# Patient Record
Sex: FEMALE | Race: WHITE | NOT HISPANIC OR LATINO | Employment: OTHER | URBAN - METROPOLITAN AREA
[De-identification: names, ages, dates, MRNs, and addresses within clinical notes are randomized per-mention and may not be internally consistent; named-entity substitution may affect disease eponyms.]

---

## 2017-02-09 RX ORDER — FLUOXETINE HYDROCHLORIDE 20 MG/1
CAPSULE ORAL
Qty: 90 CAPSULE | Refills: 0 | Status: SHIPPED | OUTPATIENT
Start: 2017-02-09 | End: 2017-05-08 | Stop reason: SDUPTHER

## 2017-05-09 RX ORDER — FLUOXETINE HYDROCHLORIDE 20 MG/1
CAPSULE ORAL
Qty: 90 CAPSULE | Refills: 1 | Status: SHIPPED | OUTPATIENT
Start: 2017-05-09 | End: 2017-07-24 | Stop reason: DRUGHIGH

## 2017-05-10 ENCOUNTER — TELEPHONE (OUTPATIENT)
Dept: ORTHOPEDICS | Age: 67
End: 2017-05-10

## 2017-05-10 DIAGNOSIS — Z96.642 STATUS POST LEFT HIP REPLACEMENT: Primary | ICD-10-CM

## 2017-05-10 RX ORDER — ZOLPIDEM TARTRATE 10 MG/1
10 TABLET ORAL NIGHTLY PRN
Qty: 30 TABLET | Refills: 5 | Status: SHIPPED
Start: 2017-05-10 | End: 2017-07-24 | Stop reason: SDUPTHER

## 2017-05-24 ENCOUNTER — TELEPHONE (OUTPATIENT)
Dept: FAMILY MEDICINE | Age: 67
End: 2017-05-24

## 2017-05-25 RX ORDER — LOSARTAN POTASSIUM 25 MG/1
50 TABLET ORAL DAILY
Qty: 90 TABLET | Refills: 1 | Status: CANCELLED | OUTPATIENT
Start: 2017-05-25

## 2017-05-26 RX ORDER — LOSARTAN POTASSIUM 50 MG/1
50 TABLET ORAL DAILY
Qty: 90 TABLET | Refills: 1 | Status: SHIPPED | OUTPATIENT
Start: 2017-05-26 | End: 2017-07-24 | Stop reason: DRUGHIGH

## 2017-06-19 ENCOUNTER — TELEPHONE (OUTPATIENT)
Dept: FAMILY MEDICINE | Age: 67
End: 2017-06-19

## 2017-06-26 RX ORDER — FLUTICASONE PROPIONATE AND SALMETEROL 250; 50 UG/1; UG/1
1 POWDER RESPIRATORY (INHALATION) 2 TIMES DAILY
Qty: 1 EACH | Refills: 0 | Status: SHIPPED | OUTPATIENT
Start: 2017-06-26 | End: 2017-07-25 | Stop reason: SDUPTHER

## 2017-07-18 ENCOUNTER — OFF PREMISE (OUTPATIENT)
Dept: OTHER | Age: 67
End: 2017-07-18

## 2017-07-22 RX ORDER — CLONAZEPAM 1 MG/1
TABLET ORAL
Qty: 60 TABLET | Refills: 5 | Status: SHIPPED
Start: 2017-07-22 | End: 2017-07-24 | Stop reason: SDUPTHER

## 2017-07-24 ENCOUNTER — IMAGING SERVICES (OUTPATIENT)
Dept: GENERAL RADIOLOGY | Age: 67
End: 2017-07-24
Attending: FAMILY MEDICINE

## 2017-07-24 ENCOUNTER — LAB SERVICES (OUTPATIENT)
Dept: LAB | Age: 67
End: 2017-07-24

## 2017-07-24 ENCOUNTER — OFFICE VISIT (OUTPATIENT)
Dept: FAMILY MEDICINE | Age: 67
End: 2017-07-24

## 2017-07-24 ENCOUNTER — TELEPHONE (OUTPATIENT)
Dept: ORTHOPEDICS | Age: 67
End: 2017-07-24

## 2017-07-24 VITALS
SYSTOLIC BLOOD PRESSURE: 138 MMHG | OXYGEN SATURATION: 98 % | HEART RATE: 68 BPM | DIASTOLIC BLOOD PRESSURE: 88 MMHG | WEIGHT: 194.4 LBS | HEIGHT: 65 IN | BODY MASS INDEX: 32.39 KG/M2

## 2017-07-24 DIAGNOSIS — Z12.31 VISIT FOR SCREENING MAMMOGRAM: ICD-10-CM

## 2017-07-24 DIAGNOSIS — Z13.0 SCREENING FOR IRON DEFICIENCY ANEMIA: ICD-10-CM

## 2017-07-24 DIAGNOSIS — Z96.642 STATUS POST LEFT HIP REPLACEMENT: ICD-10-CM

## 2017-07-24 DIAGNOSIS — I10 BENIGN ESSENTIAL HYPERTENSION: ICD-10-CM

## 2017-07-24 DIAGNOSIS — Z13.1 SCREENING FOR DIABETES MELLITUS (DM): ICD-10-CM

## 2017-07-24 DIAGNOSIS — Z13.29 SCREENING FOR THYROID DISORDER: ICD-10-CM

## 2017-07-24 DIAGNOSIS — Z00.00 WELL WOMAN EXAM (NO GYNECOLOGICAL EXAM): Primary | ICD-10-CM

## 2017-07-24 DIAGNOSIS — Z13.220 SCREENING FOR LIPOID DISORDERS: ICD-10-CM

## 2017-07-24 LAB
ALBUMIN SERPL-MCNC: 3.7 G/DL (ref 3.6–5.1)
ALBUMIN/GLOB SERPL: 1 {RATIO} (ref 1–2.4)
ALP SERPL-CCNC: 130 UNITS/L (ref 45–117)
ALT SERPL-CCNC: 21 UNITS/L
ANION GAP SERPL CALC-SCNC: 9 MMOL/L (ref 10–20)
AST SERPL-CCNC: 16 UNITS/L
BASOPHILS # BLD AUTO: 0 K/MCL (ref 0–0.3)
BASOPHILS NFR BLD AUTO: 0 %
BILIRUB SERPL-MCNC: 0.4 MG/DL (ref 0.2–1)
BUN SERPL-MCNC: 19 MG/DL (ref 6–20)
BUN/CREAT SERPL: 23 (ref 7–25)
CALCIUM SERPL-MCNC: 9 MG/DL (ref 8.4–10.2)
CHLORIDE SERPL-SCNC: 103 MMOL/L (ref 98–107)
CO2 SERPL-SCNC: 33 MMOL/L (ref 21–32)
CREAT SERPL-MCNC: 0.83 MG/DL (ref 0.51–0.95)
DIFFERENTIAL METHOD BLD: NORMAL
EOSINOPHIL # BLD AUTO: 0.3 K/MCL (ref 0.1–0.5)
EOSINOPHIL NFR SPEC: 4 %
ERYTHROCYTE [DISTWIDTH] IN BLOOD: 12.6 % (ref 11–15)
FASTING STATUS PATIENT QL REPORTED: 4 HRS
GLOBULIN SER-MCNC: 3.7 G/DL (ref 2–4)
GLUCOSE SERPL-MCNC: 87 MG/DL (ref 65–99)
HCT VFR BLD CALC: 37.8 % (ref 36–46.5)
HGB BLD-MCNC: 13.1 G/DL (ref 12–15.5)
LYMPHOCYTES # BLD MANUAL: 1.2 K/MCL (ref 1–4)
LYMPHOCYTES NFR BLD MANUAL: 19 %
MCH RBC QN AUTO: 32 PG (ref 26–34)
MCHC RBC AUTO-ENTMCNC: 34.7 G/DL (ref 32–36.5)
MCV RBC AUTO: 92.2 FL (ref 78–100)
MONOCYTES # BLD MANUAL: 0.5 K/MCL (ref 0.3–0.9)
MONOCYTES NFR BLD MANUAL: 8 %
NEUTROPHILS # BLD AUTO: 4.4 K/MCL (ref 1.8–7.7)
NEUTROPHILS NFR BLD AUTO: 69 %
PLATELET # BLD: 302 K/MCL (ref 140–450)
POTASSIUM SERPL-SCNC: 4.9 MMOL/L (ref 3.4–5.1)
PROT SERPL-MCNC: 7.4 G/DL (ref 6.4–8.2)
RBC # BLD: 4.1 MIL/MCL (ref 4–5.2)
SODIUM SERPL-SCNC: 140 MMOL/L (ref 135–145)
WBC # BLD: 6.4 K/MCL (ref 4.2–11)

## 2017-07-24 PROCEDURE — 80061 LIPID PANEL: CPT | Performed by: INTERNAL MEDICINE

## 2017-07-24 PROCEDURE — 80050 GENERAL HEALTH PANEL: CPT | Performed by: INTERNAL MEDICINE

## 2017-07-24 PROCEDURE — 83018 HEAVY METAL QUAN EACH NES: CPT | Performed by: INTERNAL MEDICINE

## 2017-07-24 PROCEDURE — 82495 ASSAY OF CHROMIUM: CPT | Performed by: INTERNAL MEDICINE

## 2017-07-24 PROCEDURE — 99396 PREV VISIT EST AGE 40-64: CPT | Performed by: FAMILY MEDICINE

## 2017-07-24 PROCEDURE — G0202 SCR MAMMO BI INCL CAD: HCPCS | Performed by: RADIOLOGY

## 2017-07-24 PROCEDURE — 36415 COLL VENOUS BLD VENIPUNCTURE: CPT | Performed by: INTERNAL MEDICINE

## 2017-07-24 RX ORDER — FLUOXETINE 10 MG/1
10 CAPSULE ORAL DAILY
Qty: 30 CAPSULE | Refills: 0 | Status: SHIPPED | OUTPATIENT
Start: 2017-07-24 | End: 2017-08-14

## 2017-07-24 RX ORDER — LOSARTAN POTASSIUM 100 MG/1
100 TABLET ORAL DAILY
Qty: 30 TABLET | Refills: 11 | Status: SHIPPED | OUTPATIENT
Start: 2017-07-24 | End: 2018-09-12 | Stop reason: SDUPTHER

## 2017-07-24 RX ORDER — CLONAZEPAM 1 MG/1
TABLET ORAL
Qty: 60 TABLET | Refills: 5 | Status: SHIPPED | OUTPATIENT
Start: 2017-07-24 | End: 2018-01-29 | Stop reason: SDUPTHER

## 2017-07-24 RX ORDER — ZOLPIDEM TARTRATE 10 MG/1
10 TABLET ORAL NIGHTLY PRN
Qty: 30 TABLET | Refills: 5 | Status: SHIPPED | OUTPATIENT
Start: 2017-07-24 | End: 2018-01-29 | Stop reason: SDUPTHER

## 2017-07-25 ENCOUNTER — TELEPHONE (OUTPATIENT)
Dept: ORTHOPEDICS | Age: 67
End: 2017-07-25

## 2017-07-25 ENCOUNTER — IMAGING SERVICES (OUTPATIENT)
Dept: GENERAL RADIOLOGY | Age: 67
End: 2017-07-25
Attending: ORTHOPAEDIC SURGERY

## 2017-07-25 ENCOUNTER — OFFICE VISIT (OUTPATIENT)
Dept: ORTHOPEDICS | Age: 67
End: 2017-07-25

## 2017-07-25 VITALS — SYSTOLIC BLOOD PRESSURE: 145 MMHG | DIASTOLIC BLOOD PRESSURE: 93 MMHG | HEART RATE: 76 BPM

## 2017-07-25 DIAGNOSIS — Z96.642 STATUS POST LEFT HIP REPLACEMENT: ICD-10-CM

## 2017-07-25 DIAGNOSIS — Z96.642 STATUS POST LEFT HIP REPLACEMENT: Primary | ICD-10-CM

## 2017-07-25 LAB
CHOLEST SERPL-MCNC: 209 MG/DL
CHOLEST/HDLC SERPL: 4.1 {RATIO}
HDLC SERPL-MCNC: 51 MG/DL
LDLC SERPL-MCNC: 118 MG/DL
LENGTH OF FAST TIME PATIENT: 4 HRS
NONHDLC SERPL-MCNC: 158 MG/DL
TRIGL SERPL-MCNC: 199 MG/DL
TSH SERPL-ACNC: 0.99 MCUNITS/ML (ref 0.35–5)

## 2017-07-25 PROCEDURE — 99213 OFFICE O/P EST LOW 20 MIN: CPT | Performed by: ORTHOPAEDIC SURGERY

## 2017-07-25 PROCEDURE — 73502 X-RAY EXAM HIP UNI 2-3 VIEWS: CPT | Performed by: RADIOLOGY

## 2017-07-25 RX ORDER — ALBUTEROL SULFATE 90 UG/1
2 AEROSOL, METERED RESPIRATORY (INHALATION) EVERY 4 HOURS PRN
Qty: 1 INHALER | Refills: 0 | Status: SHIPPED | OUTPATIENT
Start: 2017-07-25

## 2017-07-25 RX ORDER — FLUTICASONE PROPIONATE AND SALMETEROL 250; 50 UG/1; UG/1
1 POWDER RESPIRATORY (INHALATION) 2 TIMES DAILY
Qty: 1 EACH | Refills: 0 | Status: SHIPPED | OUTPATIENT
Start: 2017-07-25

## 2017-07-26 ENCOUNTER — TELEPHONE (OUTPATIENT)
Dept: ORTHOPEDICS | Age: 67
End: 2017-07-26

## 2017-07-26 LAB — COBALT SERPL-MCNC: 3.6 UG/L

## 2017-07-28 ENCOUNTER — PREP FOR CASE (OUTPATIENT)
Dept: ORTHOPEDICS | Age: 67
End: 2017-07-28

## 2017-07-28 ENCOUNTER — TELEPHONE (OUTPATIENT)
Dept: ORTHOPEDICS | Age: 67
End: 2017-07-28

## 2017-07-28 DIAGNOSIS — T84.019A PROSTHETIC JOINT IMPLANT FAILURE, INITIAL ENCOUNTER (CMD): ICD-10-CM

## 2017-07-28 DIAGNOSIS — S76.012A MUSCLE STRAIN OF GLUTEAL REGION, LEFT, INITIAL ENCOUNTER: Primary | ICD-10-CM

## 2017-07-28 LAB — CR SERPL-MCNC: <1 NG/ML

## 2017-07-31 ENCOUNTER — TELEPHONE (OUTPATIENT)
Dept: ORTHOPEDICS | Age: 67
End: 2017-07-31

## 2017-07-31 DIAGNOSIS — S76.012A MUSCLE STRAIN OF GLUTEAL REGION, LEFT, INITIAL ENCOUNTER: ICD-10-CM

## 2017-07-31 DIAGNOSIS — T84.019A PROSTHETIC JOINT IMPLANT FAILURE, INITIAL ENCOUNTER (CMD): ICD-10-CM

## 2017-07-31 DIAGNOSIS — Z01.812 ENCOUNTER FOR PREPROCEDURAL LABORATORY EXAMINATION: ICD-10-CM

## 2017-07-31 DIAGNOSIS — Z01.811 ENCOUNTER FOR PRE-OPERATIVE RESPIRATORY CLEARANCE: ICD-10-CM

## 2017-07-31 DIAGNOSIS — Z01.810 ENCOUNTER FOR PRE-OPERATIVE CARDIOVASCULAR CLEARANCE: ICD-10-CM

## 2017-07-31 DIAGNOSIS — Z01.818 PREOP GENERAL PHYSICAL EXAM: ICD-10-CM

## 2017-08-01 ENCOUNTER — TELEPHONE (OUTPATIENT)
Dept: ORTHOPEDICS | Age: 67
End: 2017-08-01

## 2017-08-03 DIAGNOSIS — Z96.642 STATUS POST LEFT HIP REPLACEMENT: Primary | ICD-10-CM

## 2017-08-03 DIAGNOSIS — Z01.818 PREOPERATIVE EXAMINATION: ICD-10-CM

## 2017-08-03 DIAGNOSIS — M25.552 LEFT HIP PAIN: ICD-10-CM

## 2017-08-03 DIAGNOSIS — M76.892 ENTHESOPATHY OF LEFT HIP REGION: ICD-10-CM

## 2017-08-04 ENCOUNTER — TELEPHONE (OUTPATIENT)
Dept: ORTHOPEDICS | Age: 67
End: 2017-08-04

## 2017-08-08 ENCOUNTER — HOSPITAL ENCOUNTER (OUTPATIENT)
Dept: REHABILITATION | Age: 67
Discharge: STILL ADMITTED OR ADMINISTRATIVE | End: 2017-08-08
Attending: ORTHOPAEDIC SURGERY

## 2017-08-08 ENCOUNTER — OFFICE VISIT (OUTPATIENT)
Dept: ORTHOPEDICS | Age: 67
End: 2017-08-08

## 2017-08-08 ENCOUNTER — LAB SERVICES (OUTPATIENT)
Dept: LAB | Age: 67
End: 2017-08-08

## 2017-08-08 DIAGNOSIS — T84.019A PROSTHETIC JOINT IMPLANT FAILURE, INITIAL ENCOUNTER (CMD): ICD-10-CM

## 2017-08-08 DIAGNOSIS — T84.091A: ICD-10-CM

## 2017-08-08 DIAGNOSIS — Z01.818 PREOPERATIVE EXAMINATION: ICD-10-CM

## 2017-08-08 DIAGNOSIS — S76.012A MUSCLE STRAIN OF GLUTEAL REGION, LEFT, INITIAL ENCOUNTER: ICD-10-CM

## 2017-08-08 DIAGNOSIS — Z96.642 STATUS POST LEFT HIP REPLACEMENT: ICD-10-CM

## 2017-08-08 DIAGNOSIS — M76.892 ENTHESOPATHY OF LEFT HIP REGION: ICD-10-CM

## 2017-08-08 DIAGNOSIS — M25.552 LEFT HIP PAIN: ICD-10-CM

## 2017-08-08 DIAGNOSIS — M25.552 LEFT HIP PAIN: Primary | ICD-10-CM

## 2017-08-08 DIAGNOSIS — Z01.812 ENCOUNTER FOR PREPROCEDURAL LABORATORY EXAMINATION: ICD-10-CM

## 2017-08-08 LAB
AMORPH SED URNS QL MICRO: PRESENT
APPEARANCE UR: CLEAR
BACTERIA #/AREA URNS HPF: ABNORMAL /HPF
BILIRUB UR QL STRIP: NEGATIVE
COLOR UR: YELLOW
GLUCOSE UR STRIP-MCNC: NEGATIVE MG/DL
HGB UR QL STRIP: ABNORMAL
HYALINE CASTS #/AREA URNS LPF: ABNORMAL /LPF (ref 0–5)
KETONES UR STRIP-MCNC: NEGATIVE MG/DL
LEUKOCYTE ESTERASE UR QL STRIP: NEGATIVE
MUCOUS THREADS URNS QL MICRO: PRESENT
NITRITE UR QL STRIP: NEGATIVE
PH UR STRIP: 6 UNITS (ref 5–7)
PROT UR STRIP-MCNC: NEGATIVE MG/DL
RBC #/AREA URNS HPF: ABNORMAL /HPF (ref 0–3)
SP GR UR STRIP: 1.01 (ref 1–1.03)
SPECIMEN SOURCE: ABNORMAL
SQUAMOUS #/AREA URNS HPF: ABNORMAL /HPF (ref 0–5)
UROBILINOGEN UR STRIP-MCNC: 0.2 MG/DL (ref 0–1)
WBC #/AREA URNS HPF: ABNORMAL /HPF (ref 0–5)

## 2017-08-08 PROCEDURE — G8980 MOBILITY D/C STATUS: HCPCS

## 2017-08-08 PROCEDURE — 81001 URINALYSIS AUTO W/SCOPE: CPT | Performed by: INTERNAL MEDICINE

## 2017-08-08 PROCEDURE — 10004173 HB COUNTER-THERAPY VISIT PT

## 2017-08-08 PROCEDURE — G8428 CUR MEDS NOT DOCUMENT: HCPCS | Performed by: ORTHOPAEDIC SURGERY

## 2017-08-08 PROCEDURE — 3014F SCREEN MAMMO DOC REV: CPT | Performed by: ORTHOPAEDIC SURGERY

## 2017-08-08 PROCEDURE — G8419 CALC BMI OUT NRM PARAM NOF/U: HCPCS | Performed by: ORTHOPAEDIC SURGERY

## 2017-08-08 PROCEDURE — G8979 MOBILITY GOAL STATUS: HCPCS

## 2017-08-08 PROCEDURE — 3017F COLORECTAL CA SCREEN DOC REV: CPT | Performed by: ORTHOPAEDIC SURGERY

## 2017-08-08 PROCEDURE — G8732 NO DOC OF PAIN: HCPCS | Performed by: ORTHOPAEDIC SURGERY

## 2017-08-08 PROCEDURE — G8978 MOBILITY CURRENT STATUS: HCPCS

## 2017-08-08 PROCEDURE — 97530 THERAPEUTIC ACTIVITIES: CPT

## 2017-08-08 PROCEDURE — 97161 PT EVAL LOW COMPLEX 20 MIN: CPT

## 2017-08-08 PROCEDURE — 99214 OFFICE O/P EST MOD 30 MIN: CPT | Performed by: ORTHOPAEDIC SURGERY

## 2017-08-08 PROCEDURE — 1036F TOBACCO NON-USER: CPT | Performed by: ORTHOPAEDIC SURGERY

## 2017-08-08 PROCEDURE — 97116 GAIT TRAINING THERAPY: CPT

## 2017-08-09 ENCOUNTER — HOSPITAL ENCOUNTER (OUTPATIENT)
Dept: ORTHOPEDICS | Age: 67
End: 2017-08-09

## 2017-08-09 ENCOUNTER — LAB SERVICES (OUTPATIENT)
Dept: LAB | Age: 67
End: 2017-08-09

## 2017-08-09 ENCOUNTER — HOSPITAL ENCOUNTER (OUTPATIENT)
Dept: LAB | Age: 67
Discharge: HOME | End: 2017-08-09
Attending: ORTHOPAEDIC SURGERY

## 2017-08-09 ENCOUNTER — HOSPITAL ENCOUNTER (OUTPATIENT)
Dept: GENERAL RADIOLOGY | Age: 67
Discharge: HOME | End: 2017-08-09
Attending: ORTHOPAEDIC SURGERY

## 2017-08-09 ENCOUNTER — OFFICE VISIT (OUTPATIENT)
Dept: CARDIOLOGY | Age: 67
End: 2017-08-09
Attending: ORTHOPAEDIC SURGERY

## 2017-08-09 DIAGNOSIS — Z01.810 ENCOUNTER FOR PRE-OPERATIVE CARDIOVASCULAR CLEARANCE: Primary | ICD-10-CM

## 2017-08-09 DIAGNOSIS — T84.019A PROSTHETIC JOINT IMPLANT FAILURE, INITIAL ENCOUNTER (CMD): ICD-10-CM

## 2017-08-09 DIAGNOSIS — Z01.812 ENCOUNTER FOR PREPROCEDURAL LABORATORY EXAMINATION: ICD-10-CM

## 2017-08-09 DIAGNOSIS — S76.012A MUSCLE STRAIN OF GLUTEAL REGION, LEFT, INITIAL ENCOUNTER: ICD-10-CM

## 2017-08-09 DIAGNOSIS — Z01.811 ENCOUNTER FOR PRE-OPERATIVE RESPIRATORY CLEARANCE: ICD-10-CM

## 2017-08-09 LAB
ABO + RH BLD: NORMAL
ANION GAP SERPL CALC-SCNC: 13 MMOL/L (ref 10–20)
BACTERIA UR CULT: NORMAL
BASOPHILS # BLD AUTO: 0.1 K/MCL (ref 0–0.3)
BASOPHILS NFR BLD AUTO: 1 %
BLD GP AB SCN SERPL QL: NEGATIVE
BLOOD BANK CMNT PATIENT-IMP: NORMAL
BUN SERPL-MCNC: 12 MG/DL (ref 6–20)
BUN/CREAT SERPL: 14 (ref 7–25)
CALCIUM SERPL-MCNC: 8.4 MG/DL (ref 8.4–10.2)
CHLORIDE SERPL-SCNC: 105 MMOL/L (ref 98–107)
CO2 SERPL-SCNC: 29 MMOL/L (ref 21–32)
CREAT SERPL-MCNC: 0.86 MG/DL (ref 0.51–0.95)
CROSSMATCH EXPIRE: NORMAL
DIFFERENTIAL METHOD BLD: NORMAL
EOSINOPHIL # BLD AUTO: 0.2 K/MCL (ref 0.1–0.5)
EOSINOPHIL NFR SPEC: 4 %
ERYTHROCYTE [DISTWIDTH] IN BLOOD: 13.1 % (ref 11–15)
FASTING STATUS PATIENT QL REPORTED: ABNORMAL HRS
GLUCOSE SERPL-MCNC: 100 MG/DL (ref 65–99)
HCT VFR BLD CALC: 40 % (ref 36–46.5)
HGB BLD-MCNC: 13.4 G/DL (ref 12–15.5)
LYMPHOCYTES # BLD MANUAL: 1.2 K/MCL (ref 1–4)
LYMPHOCYTES NFR BLD MANUAL: 23 %
MCH RBC QN AUTO: 31.5 PG (ref 26–34)
MCHC RBC AUTO-ENTMCNC: 33.5 G/DL (ref 32–36.5)
MCV RBC AUTO: 93.9 FL (ref 78–100)
MONOCYTES # BLD MANUAL: 0.5 K/MCL (ref 0.3–0.9)
MONOCYTES NFR BLD MANUAL: 10 %
NEUTROPHILS # BLD AUTO: 3.2 K/MCL (ref 1.8–7.7)
NEUTROPHILS NFR BLD AUTO: 62 %
PLATELET # BLD: 344 K/MCL (ref 140–450)
POTASSIUM SERPL-SCNC: 4.9 MMOL/L (ref 3.4–5.1)
RBC # BLD: 4.26 MIL/MCL (ref 4–5.2)
REPORT STATUS (RPT): NORMAL
SODIUM SERPL-SCNC: 142 MMOL/L (ref 135–145)
SPECIMEN SOURCE: NORMAL
WBC # BLD: 5.2 K/MCL (ref 4.2–11)

## 2017-08-09 PROCEDURE — 86850 RBC ANTIBODY SCREEN: CPT

## 2017-08-09 PROCEDURE — 36415 COLL VENOUS BLD VENIPUNCTURE: CPT | Performed by: INTERNAL MEDICINE

## 2017-08-09 PROCEDURE — 93000 ELECTROCARDIOGRAM COMPLETE: CPT | Performed by: INTERNAL MEDICINE

## 2017-08-09 PROCEDURE — 71020 XR CHEST PA AND LATERAL: CPT | Performed by: RADIOLOGY

## 2017-08-09 PROCEDURE — 71020 XR CHEST PA AND LATERAL: CPT

## 2017-08-10 RX ORDER — 0.9 % SODIUM CHLORIDE 0.9 %
2 VIAL (ML) INJECTION EVERY 12 HOURS SCHEDULED
Status: CANCELLED | OUTPATIENT
Start: 2017-08-10

## 2017-08-10 RX ORDER — 0.9 % SODIUM CHLORIDE 0.9 %
2 VIAL (ML) INJECTION PRN
Status: CANCELLED | OUTPATIENT
Start: 2017-08-10

## 2017-08-10 RX ORDER — OXYCODONE HCL 10 MG/1
10 TABLET, FILM COATED, EXTENDED RELEASE ORAL ONCE
Status: CANCELLED | OUTPATIENT
Start: 2017-08-10 | End: 2017-08-10

## 2017-08-10 RX ORDER — MUPIROCIN 20 MG/G
1 OINTMENT TOPICAL
Status: CANCELLED | OUTPATIENT
Start: 2017-08-10

## 2017-08-10 RX ORDER — SODIUM CHLORIDE, SODIUM LACTATE, POTASSIUM CHLORIDE, CALCIUM CHLORIDE 600; 310; 30; 20 MG/100ML; MG/100ML; MG/100ML; MG/100ML
INJECTION, SOLUTION INTRAVENOUS CONTINUOUS
Status: CANCELLED | OUTPATIENT
Start: 2017-08-10

## 2017-08-11 PROCEDURE — 10004348 HB COUNTER-EVAL PRE-OP

## 2017-08-11 RX ORDER — PNV NO.95/FERROUS FUM/FOLIC AC 28MG-0.8MG
TABLET ORAL DAILY
COMMUNITY
End: 2017-12-12 | Stop reason: ALTCHOICE

## 2017-08-11 ASSESSMENT — COGNITIVE AND FUNCTIONAL STATUS - GENERAL
ARE YOU DEAF OR DO YOU HAVE SERIOUS DIFFICULTY  HEARING: NO
ARE YOU BLIND OR DO YOU HAVE SERIOUS DIFFICULTY SEEING, EVEN WHEN WEARING GLASSES: NO

## 2017-08-11 ASSESSMENT — ACTIVITIES OF DAILY LIVING (ADL)
SENSORY_SUPPORT_DEVICES: EYEGLASSES;DENTURES
CHRONIC_PAIN_PRESENT: NO
HISTORY OF FALLING IN THE LAST YEAR (PRIOR TO ADMISSION): NO
RECENT_DECLINE_ADL: NO
NEEDS_ASSIST: NO
ADL_SCORE: 12
ADL_BEFORE_ADMISSION: INDEPENDENT
ADL_SHORT_OF_BREATH: NO

## 2017-08-14 ENCOUNTER — TELEPHONE (OUTPATIENT)
Dept: FAMILY MEDICINE | Age: 67
End: 2017-08-14

## 2017-08-14 ENCOUNTER — TELEPHONE (OUTPATIENT)
Dept: ORTHOPEDICS | Age: 67
End: 2017-08-14

## 2017-08-14 RX ORDER — FLUOXETINE HYDROCHLORIDE 20 MG/1
20 CAPSULE ORAL DAILY
Qty: 90 CAPSULE | Refills: 3 | Status: SHIPPED | OUTPATIENT
Start: 2017-08-14 | End: 2017-11-03 | Stop reason: SDUPTHER

## 2017-08-15 ENCOUNTER — ANESTHESIA EVENT (OUTPATIENT)
Dept: SURGERY | Age: 67
DRG: 502 | End: 2017-08-15

## 2017-08-16 ENCOUNTER — OFF PREMISE (OUTPATIENT)
Dept: ORTHOPEDICS | Age: 67
End: 2017-08-16

## 2017-08-16 ENCOUNTER — HOSPITAL ENCOUNTER (INPATIENT)
Age: 67
LOS: 1 days | Discharge: HOME | DRG: 502 | End: 2017-08-17
Attending: ORTHOPAEDIC SURGERY | Admitting: ORTHOPAEDIC SURGERY

## 2017-08-16 ENCOUNTER — APPOINTMENT (OUTPATIENT)
Dept: GENERAL RADIOLOGY | Age: 67
DRG: 502 | End: 2017-08-16
Attending: ORTHOPAEDIC SURGERY

## 2017-08-16 ENCOUNTER — APPOINTMENT (OUTPATIENT)
Dept: GENERAL RADIOLOGY | Age: 67
DRG: 502 | End: 2017-08-16
Attending: PHYSICIAN ASSISTANT

## 2017-08-16 ENCOUNTER — ANESTHESIA (OUTPATIENT)
Dept: SURGERY | Age: 67
DRG: 502 | End: 2017-08-16

## 2017-08-16 ENCOUNTER — SURGERY (OUTPATIENT)
Age: 67
End: 2017-08-16

## 2017-08-16 DIAGNOSIS — Z96.649 FAILED TOTAL HIP ARTHROPLASTY, SUBSEQUENT ENCOUNTER: ICD-10-CM

## 2017-08-16 DIAGNOSIS — T84.018D FAILED TOTAL HIP ARTHROPLASTY, SUBSEQUENT ENCOUNTER: ICD-10-CM

## 2017-08-16 DIAGNOSIS — Z96.649 FAILED TOTAL HIP ARTHROPLASTY, SUBSEQUENT ENCOUNTER: Primary | ICD-10-CM

## 2017-08-16 DIAGNOSIS — T84.018D FAILED TOTAL HIP ARTHROPLASTY, SUBSEQUENT ENCOUNTER: Primary | ICD-10-CM

## 2017-08-16 PROBLEM — M76.892 ENTHESOPATHY OF LEFT HIP REGION: Chronic | Status: ACTIVE | Noted: 2017-08-16

## 2017-08-16 PROBLEM — M76.892 ENTHESOPATHY OF LEFT HIP REGION: Status: ACTIVE | Noted: 2017-08-16

## 2017-08-16 LAB
APPEARANCE FLD: YELLOW
GLUCOSE BLDC GLUCOMTR-MCNC: 129 MG/DL (ref 65–99)
HCT VFR BLD CALC: 34.1 % (ref 36–46.5)
HGB BLD-MCNC: 11.7 G/DL (ref 12–15.5)
LYMPHOCYTES NFR SNV MANUAL: 48 % (ref 0–78)
MONOCYTES NFR SNV MANUAL: 23 % (ref 0–71)
NEUTS SEG NFR SNV: 11 % (ref 0–25)
NUC CELL # SNV MANUAL: 3118 /MCL (ref 0–200)
SPECIMEN SOURCE: ABNORMAL
SPECIMEN VOL FLD: 3 ML
SYNOVIOCYTES NFR SNV: 18 %

## 2017-08-16 PROCEDURE — G8978 MOBILITY CURRENT STATUS: HCPCS

## 2017-08-16 PROCEDURE — 10003060 HB DISPOSABLE INSTRUMENT/SUPPLY 5: Performed by: ORTHOPAEDIC SURGERY

## 2017-08-16 PROCEDURE — 10004173 HB COUNTER-THERAPY VISIT PT

## 2017-08-16 PROCEDURE — 10002358 HB ANESTH GENERAL 1ST 1/2 HR: Performed by: ORTHOPAEDIC SURGERY

## 2017-08-16 PROCEDURE — C1776 JOINT DEVICE (IMPLANTABLE): HCPCS | Performed by: ORTHOPAEDIC SURGERY

## 2017-08-16 PROCEDURE — 10002801 HB RX 250 W/O HCPCS: Performed by: PHYSICIAN ASSISTANT

## 2017-08-16 PROCEDURE — 0MBM0ZZ EXCISION OF LEFT HIP BURSA AND LIGAMENT, OPEN APPROACH: ICD-10-PCS | Performed by: ORTHOPAEDIC SURGERY

## 2017-08-16 PROCEDURE — 10002807 HB RX 258: Performed by: ANESTHESIOLOGIST ASSISTANT

## 2017-08-16 PROCEDURE — 97530 THERAPEUTIC ACTIVITIES: CPT

## 2017-08-16 PROCEDURE — G8979 MOBILITY GOAL STATUS: HCPCS

## 2017-08-16 PROCEDURE — 10002800 HB RX 250 W HCPCS: Performed by: PHYSICIAN ASSISTANT

## 2017-08-16 PROCEDURE — 10002803 HB RX 637

## 2017-08-16 PROCEDURE — 10002221 HB HIP PROCEDURES 1: Performed by: ORTHOPAEDIC SURGERY

## 2017-08-16 PROCEDURE — 89051 BODY FLUID CELL COUNT: CPT

## 2017-08-16 PROCEDURE — 87205 SMEAR GRAM STAIN: CPT

## 2017-08-16 PROCEDURE — 10003059 HB DISPOSABLE INSTRUMENT/SUPPLY 4: Performed by: ORTHOPAEDIC SURGERY

## 2017-08-16 PROCEDURE — 10002803 HB RX 637: Performed by: PHYSICIAN ASSISTANT

## 2017-08-16 PROCEDURE — 10002117 HB ADDITIONAL SURGERY TIME/30 MIN: Performed by: ORTHOPAEDIC SURGERY

## 2017-08-16 PROCEDURE — 73501 X-RAY EXAM HIP UNI 1 VIEW: CPT

## 2017-08-16 PROCEDURE — 27006 TENOTOMY ABDUCTOR&/XTNSR HIP: CPT | Performed by: ANESTHESIOLOGY

## 2017-08-16 PROCEDURE — 10004452 HB PACU ADDL 30 MINUTES: Performed by: ORTHOPAEDIC SURGERY

## 2017-08-16 PROCEDURE — 27062 REMOVE FEMUR LESION/BURSA: CPT | Performed by: ORTHOPAEDIC SURGERY

## 2017-08-16 PROCEDURE — 10002801 HB RX 250 W/O HCPCS: Performed by: ANESTHESIOLOGIST ASSISTANT

## 2017-08-16 PROCEDURE — 10002800 HB RX 250 W HCPCS: Performed by: ANESTHESIOLOGIST ASSISTANT

## 2017-08-16 PROCEDURE — 10004316 HB COUNTER-PREP

## 2017-08-16 PROCEDURE — 10002803 HB RX 637: Performed by: ANESTHESIOLOGY

## 2017-08-16 PROCEDURE — 10002016 HB COUNTER INCENTIVE SPIROMETRY

## 2017-08-16 PROCEDURE — 0S9B3ZX DRAINAGE OF LEFT HIP JOINT, PERCUTANEOUS APPROACH, DIAGNOSTIC: ICD-10-PCS | Performed by: ORTHOPAEDIC SURGERY

## 2017-08-16 PROCEDURE — 0LQK0ZZ REPAIR LEFT HIP TENDON, OPEN APPROACH: ICD-10-PCS | Performed by: ORTHOPAEDIC SURGERY

## 2017-08-16 PROCEDURE — 10000002 HB ROOM CHARGE MED SURG

## 2017-08-16 PROCEDURE — 27006 TENOTOMY ABDUCTOR&/XTNSR HIP: CPT | Performed by: ANESTHESIOLOGIST ASSISTANT

## 2017-08-16 PROCEDURE — 27299 UNLISTED PX PELVIS/HIP JOINT: CPT | Performed by: ORTHOPAEDIC SURGERY

## 2017-08-16 PROCEDURE — 85014 HEMATOCRIT: CPT

## 2017-08-16 PROCEDURE — 10002801 HB RX 250 W/O HCPCS: Performed by: ORTHOPAEDIC SURGERY

## 2017-08-16 PROCEDURE — 97162 PT EVAL MOD COMPLEX 30 MIN: CPT

## 2017-08-16 PROCEDURE — 36415 COLL VENOUS BLD VENIPUNCTURE: CPT

## 2017-08-16 PROCEDURE — 73501 X-RAY EXAM HIP UNI 1 VIEW: CPT | Performed by: RADIOLOGY

## 2017-08-16 PROCEDURE — 82962 GLUCOSE BLOOD TEST: CPT

## 2017-08-16 PROCEDURE — 10004451 HB PACU RECOVERY 1ST 30 MINUTES: Performed by: ORTHOPAEDIC SURGERY

## 2017-08-16 PROCEDURE — 10002807 HB RX 258: Performed by: ANESTHESIOLOGY

## 2017-08-16 PROCEDURE — 27299 UNLISTED PX PELVIS/HIP JOINT: CPT | Performed by: PHYSICIAN ASSISTANT

## 2017-08-16 PROCEDURE — 10002807 HB RX 258: Performed by: PHYSICIAN ASSISTANT

## 2017-08-16 PROCEDURE — 10002359 HB ANESTH GENERAL ADD'L 1/2 HR: Performed by: ORTHOPAEDIC SURGERY

## 2017-08-16 PROCEDURE — 87070 CULTURE OTHR SPECIMN AEROBIC: CPT

## 2017-08-16 PROCEDURE — 10004348 HB COUNTER-EVAL PRE-OP

## 2017-08-16 DEVICE — MMIS - MESH 50X25MM BIOFIBER POR STRIP ABS BIOLOGIC: Type: IMPLANTABLE DEVICE | Site: HIP | Status: FUNCTIONAL

## 2017-08-16 DEVICE — IMPLANTABLE DEVICE: Type: IMPLANTABLE DEVICE | Site: HIP | Status: FUNCTIONAL

## 2017-08-16 RX ORDER — OXYCODONE HCL 10 MG/1
10 TABLET, FILM COATED, EXTENDED RELEASE ORAL ONCE
Status: COMPLETED | OUTPATIENT
Start: 2017-08-16 | End: 2017-08-16

## 2017-08-16 RX ORDER — HYDROMORPHONE HYDROCHLORIDE 1 MG/ML
INJECTION, SOLUTION INTRAMUSCULAR; INTRAVENOUS; SUBCUTANEOUS PRN
Status: DISCONTINUED | OUTPATIENT
Start: 2017-08-16 | End: 2017-08-16

## 2017-08-16 RX ORDER — FLUTICASONE PROPIONATE AND SALMETEROL 250; 50 UG/1; UG/1
1 POWDER RESPIRATORY (INHALATION) 2 TIMES DAILY
Status: DISCONTINUED | OUTPATIENT
Start: 2017-08-16 | End: 2017-08-17 | Stop reason: HOSPADM

## 2017-08-16 RX ORDER — DIPHENHYDRAMINE HYDROCHLORIDE 50 MG/ML
25 INJECTION INTRAMUSCULAR; INTRAVENOUS EVERY 4 HOURS PRN
Status: DISCONTINUED | OUTPATIENT
Start: 2017-08-16 | End: 2017-08-16 | Stop reason: HOSPADM

## 2017-08-16 RX ORDER — BUPIVACAINE HYDROCHLORIDE AND EPINEPHRINE 5; 5 MG/ML; UG/ML
INJECTION, SOLUTION EPIDURAL; INTRACAUDAL; PERINEURAL PRN
Status: DISCONTINUED | OUTPATIENT
Start: 2017-08-16 | End: 2017-08-16 | Stop reason: HOSPADM

## 2017-08-16 RX ORDER — ONDANSETRON 2 MG/ML
4 INJECTION INTRAMUSCULAR; INTRAVENOUS 2 TIMES DAILY PRN
Status: DISCONTINUED | OUTPATIENT
Start: 2017-08-16 | End: 2017-08-17 | Stop reason: HOSPADM

## 2017-08-16 RX ORDER — IPRATROPIUM BROMIDE AND ALBUTEROL SULFATE 2.5; .5 MG/3ML; MG/3ML
3 SOLUTION RESPIRATORY (INHALATION) EVERY 4 HOURS PRN
Status: DISCONTINUED | OUTPATIENT
Start: 2017-08-16 | End: 2017-08-17 | Stop reason: HOSPADM

## 2017-08-16 RX ORDER — LIDOCAINE HYDROCHLORIDE 10 MG/ML
5-10 INJECTION, SOLUTION INFILTRATION; PERINEURAL PRN
Status: DISCONTINUED | OUTPATIENT
Start: 2017-08-16 | End: 2017-08-16 | Stop reason: HOSPADM

## 2017-08-16 RX ORDER — CALCIUM CARBONATE 500 MG/1
1000 TABLET, CHEWABLE ORAL DAILY
Status: DISCONTINUED | OUTPATIENT
Start: 2017-08-17 | End: 2017-08-17 | Stop reason: HOSPADM

## 2017-08-16 RX ORDER — BISACODYL 10 MG
10 SUPPOSITORY, RECTAL RECTAL DAILY PRN
Status: DISCONTINUED | OUTPATIENT
Start: 2017-08-18 | End: 2017-08-17 | Stop reason: HOSPADM

## 2017-08-16 RX ORDER — CALCIUM CARBONATE 500 MG/1
500-1000 TABLET, CHEWABLE ORAL EVERY 4 HOURS PRN
Status: DISCONTINUED | OUTPATIENT
Start: 2017-08-16 | End: 2017-08-17 | Stop reason: HOSPADM

## 2017-08-16 RX ORDER — SODIUM CHLORIDE, SODIUM LACTATE, POTASSIUM CHLORIDE, CALCIUM CHLORIDE 600; 310; 30; 20 MG/100ML; MG/100ML; MG/100ML; MG/100ML
INJECTION, SOLUTION INTRAVENOUS CONTINUOUS
Status: DISCONTINUED | OUTPATIENT
Start: 2017-08-16 | End: 2017-08-16

## 2017-08-16 RX ORDER — MIDAZOLAM HYDROCHLORIDE 1 MG/ML
INJECTION, SOLUTION INTRAMUSCULAR; INTRAVENOUS PRN
Status: DISCONTINUED | OUTPATIENT
Start: 2017-08-16 | End: 2017-08-16

## 2017-08-16 RX ORDER — 0.9 % SODIUM CHLORIDE 0.9 %
2 VIAL (ML) INJECTION PRN
Status: DISCONTINUED | OUTPATIENT
Start: 2017-08-16 | End: 2017-08-16 | Stop reason: HOSPADM

## 2017-08-16 RX ORDER — HYDROCODONE BITARTRATE AND ACETAMINOPHEN 5; 325 MG/1; MG/1
1-2 TABLET ORAL EVERY 4 HOURS PRN
Status: DISCONTINUED | OUTPATIENT
Start: 2017-08-16 | End: 2017-08-17 | Stop reason: HOSPADM

## 2017-08-16 RX ORDER — ONDANSETRON 2 MG/ML
INJECTION INTRAMUSCULAR; INTRAVENOUS PRN
Status: DISCONTINUED | OUTPATIENT
Start: 2017-08-16 | End: 2017-08-16

## 2017-08-16 RX ORDER — MUPIROCIN 20 MG/G
1 OINTMENT TOPICAL
Status: DISCONTINUED | OUTPATIENT
Start: 2017-08-16 | End: 2017-08-16 | Stop reason: HOSPADM

## 2017-08-16 RX ORDER — ACETAMINOPHEN 500 MG
1000 TABLET ORAL EVERY 8 HOURS PRN
Status: DISCONTINUED | OUTPATIENT
Start: 2017-08-16 | End: 2017-08-17 | Stop reason: HOSPADM

## 2017-08-16 RX ORDER — PHENYLEPHRINE HYDROCHLORIDE 10 MG/ML
INJECTION, SOLUTION INTRAMUSCULAR; INTRAVENOUS; SUBCUTANEOUS PRN
Status: DISCONTINUED | OUTPATIENT
Start: 2017-08-16 | End: 2017-08-16

## 2017-08-16 RX ORDER — FLUOXETINE HYDROCHLORIDE 20 MG/1
20 CAPSULE ORAL DAILY
Status: DISCONTINUED | OUTPATIENT
Start: 2017-08-16 | End: 2017-08-17 | Stop reason: HOSPADM

## 2017-08-16 RX ORDER — HYDROCODONE BITARTRATE AND ACETAMINOPHEN 5; 325 MG/1; MG/1
TABLET ORAL
Status: COMPLETED
Start: 2017-08-16 | End: 2017-08-16

## 2017-08-16 RX ORDER — SENNOSIDES A AND B 8.6 MG/1
1 TABLET, FILM COATED ORAL NIGHTLY
Status: DISCONTINUED | OUTPATIENT
Start: 2017-08-16 | End: 2017-08-17 | Stop reason: HOSPADM

## 2017-08-16 RX ORDER — HYDROMORPHONE HYDROCHLORIDE 1 MG/ML
.2-.4 INJECTION, SOLUTION INTRAMUSCULAR; INTRAVENOUS; SUBCUTANEOUS EVERY 5 MIN PRN
Status: DISCONTINUED | OUTPATIENT
Start: 2017-08-16 | End: 2017-08-16 | Stop reason: HOSPADM

## 2017-08-16 RX ORDER — ALBUTEROL SULFATE 90 UG/1
2 AEROSOL, METERED RESPIRATORY (INHALATION) EVERY 4 HOURS PRN
Status: DISCONTINUED | OUTPATIENT
Start: 2017-08-16 | End: 2017-08-17 | Stop reason: HOSPADM

## 2017-08-16 RX ORDER — 0.9 % SODIUM CHLORIDE 0.9 %
2 VIAL (ML) INJECTION EVERY 12 HOURS SCHEDULED
Status: DISCONTINUED | OUTPATIENT
Start: 2017-08-16 | End: 2017-08-16 | Stop reason: HOSPADM

## 2017-08-16 RX ORDER — POLYETHYLENE GLYCOL 3350 17 G/17G
17 POWDER, FOR SOLUTION ORAL 2 TIMES DAILY
Status: DISCONTINUED | OUTPATIENT
Start: 2017-08-16 | End: 2017-08-17 | Stop reason: HOSPADM

## 2017-08-16 RX ORDER — DEXTROSE MONOHYDRATE, SODIUM CHLORIDE, AND POTASSIUM CHLORIDE 50; 1.49; 4.5 G/1000ML; G/1000ML; G/1000ML
INJECTION, SOLUTION INTRAVENOUS CONTINUOUS
Status: DISCONTINUED | OUTPATIENT
Start: 2017-08-16 | End: 2017-08-17 | Stop reason: HOSPADM

## 2017-08-16 RX ORDER — FAMOTIDINE 20 MG/1
20 TABLET, FILM COATED ORAL
Status: COMPLETED | OUTPATIENT
Start: 2017-08-16 | End: 2017-08-16

## 2017-08-16 RX ORDER — DEXAMETHASONE SODIUM PHOSPHATE 4 MG/ML
INJECTION, SOLUTION INTRA-ARTICULAR; INTRALESIONAL; INTRAMUSCULAR; INTRAVENOUS; SOFT TISSUE PRN
Status: DISCONTINUED | OUTPATIENT
Start: 2017-08-16 | End: 2017-08-16

## 2017-08-16 RX ORDER — BUPIVACAINE HYDROCHLORIDE AND EPINEPHRINE 5; 5 MG/ML; UG/ML
INJECTION, SOLUTION EPIDURAL; INTRACAUDAL; PERINEURAL
Status: DISPENSED
Start: 2017-08-16 | End: 2017-08-16

## 2017-08-16 RX ORDER — LORATADINE 10 MG/1
10 TABLET ORAL DAILY PRN
Status: DISCONTINUED | OUTPATIENT
Start: 2017-08-16 | End: 2017-08-17 | Stop reason: HOSPADM

## 2017-08-16 RX ORDER — SODIUM CHLORIDE, SODIUM LACTATE, POTASSIUM CHLORIDE, CALCIUM CHLORIDE 600; 310; 30; 20 MG/100ML; MG/100ML; MG/100ML; MG/100ML
INJECTION, SOLUTION INTRAVENOUS CONTINUOUS PRN
Status: DISCONTINUED | OUTPATIENT
Start: 2017-08-16 | End: 2017-08-16

## 2017-08-16 RX ORDER — LIDOCAINE HYDROCHLORIDE 10 MG/ML
INJECTION, SOLUTION INFILTRATION; PERINEURAL PRN
Status: DISCONTINUED | OUTPATIENT
Start: 2017-08-16 | End: 2017-08-16

## 2017-08-16 RX ORDER — ROCURONIUM BROMIDE 10 MG/ML
INJECTION, SOLUTION INTRAVENOUS PRN
Status: DISCONTINUED | OUTPATIENT
Start: 2017-08-16 | End: 2017-08-16

## 2017-08-16 RX ORDER — PROPOFOL 10 MG/ML
INJECTION, EMULSION INTRAVENOUS PRN
Status: DISCONTINUED | OUTPATIENT
Start: 2017-08-16 | End: 2017-08-16

## 2017-08-16 RX ORDER — DIAZEPAM 2 MG/1
2 TABLET ORAL EVERY 8 HOURS PRN
Status: DISCONTINUED | OUTPATIENT
Start: 2017-08-16 | End: 2017-08-17 | Stop reason: HOSPADM

## 2017-08-16 RX ORDER — DIPHENHYDRAMINE HCL 25 MG
25 CAPSULE ORAL EVERY 6 HOURS PRN
Status: DISCONTINUED | OUTPATIENT
Start: 2017-08-16 | End: 2017-08-17 | Stop reason: HOSPADM

## 2017-08-16 RX ORDER — CLONAZEPAM 0.5 MG/1
.5-1 TABLET ORAL 2 TIMES DAILY PRN
Status: DISCONTINUED | OUTPATIENT
Start: 2017-08-16 | End: 2017-08-17 | Stop reason: HOSPADM

## 2017-08-16 RX ORDER — ACETAMINOPHEN 10 MG/ML
INJECTION, SOLUTION INTRAVENOUS PRN
Status: DISCONTINUED | OUTPATIENT
Start: 2017-08-16 | End: 2017-08-16

## 2017-08-16 RX ORDER — FERROUS SULFATE 325(65) MG
325 TABLET ORAL 2 TIMES DAILY WITH MEALS
Status: DISCONTINUED | OUTPATIENT
Start: 2017-08-17 | End: 2017-08-17 | Stop reason: HOSPADM

## 2017-08-16 RX ORDER — LOSARTAN POTASSIUM 50 MG/1
100 TABLET ORAL DAILY
Status: DISCONTINUED | OUTPATIENT
Start: 2017-08-16 | End: 2017-08-17 | Stop reason: HOSPADM

## 2017-08-16 RX ORDER — METOCLOPRAMIDE 10 MG/1
10 TABLET ORAL
Status: COMPLETED | OUTPATIENT
Start: 2017-08-16 | End: 2017-08-16

## 2017-08-16 RX ORDER — ZOLPIDEM TARTRATE 5 MG/1
5 TABLET ORAL NIGHTLY PRN
Status: DISCONTINUED | OUTPATIENT
Start: 2017-08-16 | End: 2017-08-17 | Stop reason: HOSPADM

## 2017-08-16 RX ORDER — PROCHLORPERAZINE MALEATE 5 MG/1
5 TABLET ORAL EVERY 4 HOURS PRN
Status: DISCONTINUED | OUTPATIENT
Start: 2017-08-16 | End: 2017-08-17 | Stop reason: HOSPADM

## 2017-08-16 RX ORDER — ASPIRIN 325 MG
325 TABLET ORAL EVERY 12 HOURS SCHEDULED
Status: DISCONTINUED | OUTPATIENT
Start: 2017-08-17 | End: 2017-08-17 | Stop reason: HOSPADM

## 2017-08-16 RX ADMIN — FLUOXETINE 20 MG: 20 CAPSULE ORAL at 16:39

## 2017-08-16 RX ADMIN — KETOROLAC TROMETHAMINE 15 MG: 15 INJECTION, SOLUTION INTRAMUSCULAR; INTRAVENOUS at 23:19

## 2017-08-16 RX ADMIN — FENTANYL CITRATE 100 MCG: 50 INJECTION INTRAMUSCULAR; INTRAVENOUS at 11:36

## 2017-08-16 RX ADMIN — AMINOCAPROIC ACID 10 G: 250 INJECTION, SOLUTION INTRAVENOUS at 12:06

## 2017-08-16 RX ADMIN — SODIUM CHLORIDE, POTASSIUM CHLORIDE, SODIUM LACTATE AND CALCIUM CHLORIDE: 600; 310; 30; 20 INJECTION, SOLUTION INTRAVENOUS at 11:21

## 2017-08-16 RX ADMIN — DEXAMETHASONE SODIUM PHOSPHATE 8 MG: 4 INJECTION, SOLUTION INTRAMUSCULAR; INTRAVENOUS at 11:44

## 2017-08-16 RX ADMIN — HYDROMORPHONE HYDROCHLORIDE 0.5 MG: 1 INJECTION, SOLUTION INTRAMUSCULAR; INTRAVENOUS; SUBCUTANEOUS at 13:21

## 2017-08-16 RX ADMIN — SODIUM CHLORIDE, POTASSIUM CHLORIDE, SODIUM LACTATE AND CALCIUM CHLORIDE: 600; 310; 30; 20 INJECTION, SOLUTION INTRAVENOUS at 11:10

## 2017-08-16 RX ADMIN — POTASSIUM CHLORIDE, DEXTROSE MONOHYDRATE AND SODIUM CHLORIDE: 150; 5; 450 INJECTION, SOLUTION INTRAVENOUS at 16:39

## 2017-08-16 RX ADMIN — SENNOSIDES 8.6 MG: 8.6 TABLET, FILM COATED ORAL at 22:01

## 2017-08-16 RX ADMIN — FAMOTIDINE 20 MG: 20 TABLET, FILM COATED ORAL at 10:59

## 2017-08-16 RX ADMIN — OXYCODONE HYDROCHLORIDE 10 MG: 10 TABLET, FILM COATED, EXTENDED RELEASE ORAL at 10:59

## 2017-08-16 RX ADMIN — SODIUM CHLORIDE 2000 MG: 9 INJECTION, SOLUTION INTRAVENOUS at 16:39

## 2017-08-16 RX ADMIN — BUPIVACAINE HYDROCHLORIDE AND EPINEPHRINE BITARTRATE 44 ML: 5; .005 INJECTION, SOLUTION EPIDURAL; INTRACAUDAL; PERINEURAL at 13:06

## 2017-08-16 RX ADMIN — METOCLOPRAMIDE 10 MG: 10 TABLET ORAL at 10:59

## 2017-08-16 RX ADMIN — MIDAZOLAM HYDROCHLORIDE 2 MG: 1 INJECTION, SOLUTION INTRAMUSCULAR; INTRAVENOUS at 11:30

## 2017-08-16 RX ADMIN — KETOROLAC TROMETHAMINE 15 MG: 15 INJECTION, SOLUTION INTRAMUSCULAR; INTRAVENOUS at 17:38

## 2017-08-16 RX ADMIN — PROPOFOL 150 MG: 10 INJECTION, EMULSION INTRAVENOUS at 11:36

## 2017-08-16 RX ADMIN — PHENYLEPHRINE HYDROCHLORIDE 0.1 MG: 10 INJECTION INTRAVENOUS at 11:46

## 2017-08-16 RX ADMIN — ACETAMINOPHEN 1000 MG: 10 INJECTION, SOLUTION INTRAVENOUS at 12:19

## 2017-08-16 RX ADMIN — HYDROCODONE BITARTRATE AND ACETAMINOPHEN 1 TABLET: 5; 325 TABLET ORAL at 14:20

## 2017-08-16 RX ADMIN — SODIUM CHLORIDE 2000 MG: 9 INJECTION, SOLUTION INTRAVENOUS at 22:01

## 2017-08-16 RX ADMIN — CEFAZOLIN 2000 MG: 10 INJECTION, POWDER, FOR SOLUTION INTRAVENOUS at 11:41

## 2017-08-16 RX ADMIN — LIDOCAINE HYDROCHLORIDE 20 MG: 10 INJECTION, SOLUTION INFILTRATION; PERINEURAL at 11:36

## 2017-08-16 RX ADMIN — PROPOFOL 75 MCG/KG/MIN: 10 INJECTION, EMULSION INTRAVENOUS at 11:43

## 2017-08-16 RX ADMIN — CLONAZEPAM 0.5 MG: 0.5 TABLET ORAL at 23:31

## 2017-08-16 RX ADMIN — SODIUM CHLORIDE, POTASSIUM CHLORIDE, SODIUM LACTATE AND CALCIUM CHLORIDE: 600; 310; 30; 20 INJECTION, SOLUTION INTRAVENOUS at 13:04

## 2017-08-16 RX ADMIN — ZOLPIDEM TARTRATE 5 MG: 5 TABLET, COATED ORAL at 23:32

## 2017-08-16 RX ADMIN — ONDANSETRON 4 MG: 2 SOLUTION INTRAMUSCULAR; INTRAVENOUS at 12:53

## 2017-08-16 RX ADMIN — ROCURONIUM BROMIDE 50 MG: 10 INJECTION INTRAVENOUS at 11:36

## 2017-08-16 RX ADMIN — SUGAMMADEX 200 MG: 100 INJECTION, SOLUTION INTRAVENOUS at 13:12

## 2017-08-16 RX ADMIN — HYDROMORPHONE HYDROCHLORIDE 0.5 MG: 1 INJECTION, SOLUTION INTRAMUSCULAR; INTRAVENOUS; SUBCUTANEOUS at 11:50

## 2017-08-16 ASSESSMENT — ACTIVITIES OF DAILY LIVING (ADL)
RECENT_DECLINE_ADL: NO
CHRONIC_PAIN_PRESENT: NO
ADL_BEFORE_ADMISSION: INDEPENDENT
PRIOR_ADL_TOILETING: INDEPENDENT
ADL_SCORE: 12
ADL_SHORT_OF_BREATH: NO
PRIOR_ADL_BATHING: INDEPENDENT
EATING: INDEPENDENT
GROOMING: INDEPENDENT

## 2017-08-16 ASSESSMENT — PAIN SCALES - GENERAL
PAIN_LEVEL_AT_REST: 2
PAIN_LEVEL_AT_REST: 4
PAIN_LEVEL_AT_REST: 3
PAIN_LEVEL_AT_REST: 4
PAIN_LEVEL_AT_REST: 3
PAIN_LEVEL_AT_REST: 4

## 2017-08-16 ASSESSMENT — LIFESTYLE VARIABLES
E-CIGARETTE_USE: NO E-CIGARETTES USE IN THE LAST 30 DAYS
TOBACCO_USE_STATUS_IN_THE_LAST_30_DAYS: NO TOBACCO USED IN THE LAST 30 DAYS
AUDIT-C TOTAL SCORE: 4
HOW OFTEN DO YOU HAVE 6 OR MORE DRINKS ON ONE OCCASION: NEVER
ALCOHOL_USE_STATUS: NO OR LOW RISK WITH VALIDATED TOOL
HOW OFTEN DO YOU HAVE A DRINK CONTAINING ALCOHOL: 4 OR MORE TIMES PER WEEK
HOW MANY STANDARD DRINKS CONTAINING ALCOHOL DO YOU HAVE ON A TYPICAL DAY: 0,1 OR 2

## 2017-08-16 ASSESSMENT — COGNITIVE AND FUNCTIONAL STATUS - GENERAL
ARE YOU BLIND OR DO YOU HAVE SERIOUS DIFFICULTY SEEING, EVEN WHEN WEARING GLASSES: NO
ARE YOU DEAF OR DO YOU HAVE SERIOUS DIFFICULTY  HEARING: NO

## 2017-08-16 ASSESSMENT — ENCOUNTER SYMPTOMS: EXERCISE TOLERANCE: GOOD

## 2017-08-17 VITALS
SYSTOLIC BLOOD PRESSURE: 118 MMHG | DIASTOLIC BLOOD PRESSURE: 72 MMHG | TEMPERATURE: 98.3 F | HEART RATE: 64 BPM | RESPIRATION RATE: 16 BRPM | OXYGEN SATURATION: 96 % | HEIGHT: 65 IN | WEIGHT: 195.11 LBS | BODY MASS INDEX: 32.51 KG/M2

## 2017-08-17 LAB
HCT VFR BLD CALC: 31.8 % (ref 36–46.5)
HGB BLD-MCNC: 10.8 G/DL (ref 12–15.5)

## 2017-08-17 PROCEDURE — 36415 COLL VENOUS BLD VENIPUNCTURE: CPT

## 2017-08-17 PROCEDURE — 85014 HEMATOCRIT: CPT

## 2017-08-17 PROCEDURE — 97110 THERAPEUTIC EXERCISES: CPT

## 2017-08-17 PROCEDURE — 97116 GAIT TRAINING THERAPY: CPT

## 2017-08-17 PROCEDURE — 10002807 HB RX 258: Performed by: PHYSICIAN ASSISTANT

## 2017-08-17 PROCEDURE — G8987 SELF CARE CURRENT STATUS: HCPCS

## 2017-08-17 PROCEDURE — 10002800 HB RX 250 W HCPCS: Performed by: PHYSICIAN ASSISTANT

## 2017-08-17 PROCEDURE — 97535 SELF CARE MNGMENT TRAINING: CPT

## 2017-08-17 PROCEDURE — 10000002 HB ROOM CHARGE MED SURG

## 2017-08-17 PROCEDURE — 97530 THERAPEUTIC ACTIVITIES: CPT

## 2017-08-17 PROCEDURE — 10002803 HB RX 637: Performed by: PHYSICIAN ASSISTANT

## 2017-08-17 PROCEDURE — 10004651 HB RX, NO CHARGE ITEM: Performed by: PHYSICIAN ASSISTANT

## 2017-08-17 PROCEDURE — 10004172 HB COUNTER-THERAPY VISIT OT

## 2017-08-17 PROCEDURE — G8988 SELF CARE GOAL STATUS: HCPCS

## 2017-08-17 PROCEDURE — 97166 OT EVAL MOD COMPLEX 45 MIN: CPT

## 2017-08-17 PROCEDURE — 97165 OT EVAL LOW COMPLEX 30 MIN: CPT

## 2017-08-17 PROCEDURE — 10004173 HB COUNTER-THERAPY VISIT PT

## 2017-08-17 PROCEDURE — 94640 AIRWAY INHALATION TREATMENT: CPT

## 2017-08-17 RX ORDER — ASPIRIN 325 MG
325 TABLET ORAL EVERY 12 HOURS SCHEDULED
Qty: 80 TABLET | Refills: 0 | Status: SHIPPED | OUTPATIENT
Start: 2017-08-17 | End: 2017-10-17 | Stop reason: ALTCHOICE

## 2017-08-17 RX ORDER — HYDROCODONE BITARTRATE AND ACETAMINOPHEN 5; 325 MG/1; MG/1
1-2 TABLET ORAL EVERY 4 HOURS PRN
Qty: 60 TABLET | Refills: 0 | Status: SHIPPED | OUTPATIENT
Start: 2017-08-17 | End: 2017-08-29 | Stop reason: SDUPTHER

## 2017-08-17 RX ORDER — POLYETHYLENE GLYCOL 3350 17 G/17G
17 POWDER, FOR SOLUTION ORAL DAILY
Qty: 14 EACH | Refills: 0 | Status: SHIPPED | OUTPATIENT
Start: 2017-08-17 | End: 2017-10-17 | Stop reason: ALTCHOICE

## 2017-08-17 RX ADMIN — FLUTICASONE PROPIONATE AND SALMETEROL 1 PUFF: 50; 250 POWDER RESPIRATORY (INHALATION) at 08:00

## 2017-08-17 RX ADMIN — ASPIRIN 325 MG: 325 TABLET ORAL at 05:31

## 2017-08-17 RX ADMIN — HYDROCODONE BITARTRATE AND ACETAMINOPHEN 1 TABLET: 5; 325 TABLET ORAL at 08:09

## 2017-08-17 RX ADMIN — HYDROCODONE BITARTRATE AND ACETAMINOPHEN 1 TABLET: 5; 325 TABLET ORAL at 03:42

## 2017-08-17 RX ADMIN — CALCIUM CARBONATE (ANTACID) CHEW TAB 500 MG 1000 MG: 500 CHEW TAB at 08:09

## 2017-08-17 RX ADMIN — KETOROLAC TROMETHAMINE 15 MG: 15 INJECTION, SOLUTION INTRAMUSCULAR; INTRAVENOUS at 05:31

## 2017-08-17 RX ADMIN — FLUOXETINE 20 MG: 20 CAPSULE ORAL at 08:09

## 2017-08-17 RX ADMIN — Medication 325 MG: at 08:09

## 2017-08-17 RX ADMIN — HYDROCODONE BITARTRATE AND ACETAMINOPHEN 1 TABLET: 5; 325 TABLET ORAL at 12:44

## 2017-08-17 RX ADMIN — POTASSIUM CHLORIDE, DEXTROSE MONOHYDRATE AND SODIUM CHLORIDE: 150; 5; 450 INJECTION, SOLUTION INTRAVENOUS at 03:42

## 2017-08-17 ASSESSMENT — PAIN SCALES - GENERAL
PAIN_LEVEL_AT_REST: 6
PAIN_LEVEL_AT_REST: 4
PAIN_LEVEL_AT_REST: 5
PAIN_LEVEL_AT_REST: 2

## 2017-08-18 ENCOUNTER — TELEPHONE (OUTPATIENT)
Dept: ORTHOPEDICS | Age: 67
End: 2017-08-18

## 2017-08-24 ENCOUNTER — TELEPHONE (OUTPATIENT)
Dept: ORTHOPEDICS | Age: 67
End: 2017-08-24

## 2017-08-25 PROBLEM — Z98.890 STATUS POST TENDON REPAIR: Status: ACTIVE | Noted: 2017-08-16

## 2017-08-29 ENCOUNTER — IMAGING SERVICES (OUTPATIENT)
Dept: GENERAL RADIOLOGY | Age: 67
End: 2017-08-29
Attending: ORTHOPAEDIC SURGERY

## 2017-08-29 ENCOUNTER — OFFICE VISIT (OUTPATIENT)
Dept: ORTHOPEDICS | Age: 67
End: 2017-08-29

## 2017-08-29 VITALS — DIASTOLIC BLOOD PRESSURE: 93 MMHG | SYSTOLIC BLOOD PRESSURE: 120 MMHG | HEART RATE: 82 BPM | TEMPERATURE: 98.5 F

## 2017-08-29 DIAGNOSIS — Z98.890 STATUS POST TENDON REPAIR: Primary | ICD-10-CM

## 2017-08-29 DIAGNOSIS — Z98.890 STATUS POST TENDON REPAIR: ICD-10-CM

## 2017-08-29 PROCEDURE — 99024 POSTOP FOLLOW-UP VISIT: CPT | Performed by: ORTHOPAEDIC SURGERY

## 2017-08-29 PROCEDURE — 73501 X-RAY EXAM HIP UNI 1 VIEW: CPT | Performed by: RADIOLOGY

## 2017-08-29 RX ORDER — HYDROCODONE BITARTRATE AND ACETAMINOPHEN 5; 325 MG/1; MG/1
1-2 TABLET ORAL EVERY 4 HOURS PRN
Qty: 60 TABLET | Refills: 0 | Status: SHIPPED | OUTPATIENT
Start: 2017-08-29 | End: 2017-10-17 | Stop reason: ALTCHOICE

## 2017-08-30 LAB
ANNOTATION COMMENT IMP: NORMAL
BACTERIA SPEC CULT: NORMAL
GRAM STN SPEC: NORMAL
REPORT STATUS (RPT): NORMAL
SPECIMEN SOURCE: NORMAL

## 2017-08-30 RX ORDER — FLUTICASONE PROPIONATE AND SALMETEROL 250; 50 UG/1; UG/1
1 POWDER RESPIRATORY (INHALATION) 2 TIMES DAILY
Qty: 1 EACH | Refills: 0 | OUTPATIENT
Start: 2017-08-30

## 2017-09-05 ENCOUNTER — TELEPHONE (OUTPATIENT)
Dept: ORTHOPEDICS | Age: 67
End: 2017-09-05

## 2017-09-11 ENCOUNTER — TELEPHONE (OUTPATIENT)
Dept: ORTHOPEDICS | Age: 67
End: 2017-09-11

## 2017-09-15 ENCOUNTER — TELEPHONE (OUTPATIENT)
Dept: ORTHOPEDICS | Age: 67
End: 2017-09-15

## 2017-09-21 ENCOUNTER — TELEPHONE (OUTPATIENT)
Dept: ORTHOPEDICS | Age: 67
End: 2017-09-21

## 2017-09-25 ENCOUNTER — APPOINTMENT (OUTPATIENT)
Dept: REHABILITATION | Age: 67
End: 2017-09-25
Attending: ORTHOPAEDIC SURGERY

## 2017-09-27 ENCOUNTER — TELEPHONE (OUTPATIENT)
Dept: ORTHOPEDICS | Age: 67
End: 2017-09-27

## 2017-10-17 ENCOUNTER — OFFICE VISIT (OUTPATIENT)
Dept: ORTHOPEDICS | Age: 67
End: 2017-10-17

## 2017-10-17 VITALS — HEART RATE: 78 BPM | SYSTOLIC BLOOD PRESSURE: 138 MMHG | DIASTOLIC BLOOD PRESSURE: 99 MMHG

## 2017-10-17 DIAGNOSIS — Z98.890 STATUS POST TENDON REPAIR: Primary | ICD-10-CM

## 2017-10-17 PROCEDURE — 99024 POSTOP FOLLOW-UP VISIT: CPT | Performed by: ORTHOPAEDIC SURGERY

## 2017-10-19 RX ORDER — ZOLPIDEM TARTRATE 10 MG/1
10 TABLET ORAL NIGHTLY
Qty: 13 TABLET | Refills: 0 | Status: SHIPPED
Start: 2017-10-19 | End: 2017-11-03 | Stop reason: SDUPTHER

## 2017-10-19 RX ORDER — ZOLPIDEM TARTRATE 10 MG/1
10 TABLET ORAL NIGHTLY PRN
Qty: 30 TABLET | Refills: 5 | Status: CANCELLED | OUTPATIENT
Start: 2017-10-19

## 2017-10-23 ENCOUNTER — TELEPHONE (OUTPATIENT)
Dept: ORTHOPEDICS | Age: 67
End: 2017-10-23

## 2017-10-25 ENCOUNTER — HOSPITAL ENCOUNTER (OUTPATIENT)
Dept: REHABILITATION | Age: 67
Discharge: STILL ADMITTED OR ADMINISTRATIVE | End: 2017-10-25
Attending: ORTHOPAEDIC SURGERY

## 2017-10-25 DIAGNOSIS — Z98.890 STATUS POST TENDON REPAIR: ICD-10-CM

## 2017-10-25 PROCEDURE — G8978 MOBILITY CURRENT STATUS: HCPCS | Performed by: PHYSICAL THERAPIST

## 2017-10-25 PROCEDURE — 10004173 HB COUNTER-THERAPY VISIT PT: Performed by: PHYSICAL THERAPIST

## 2017-10-25 PROCEDURE — 97116 GAIT TRAINING THERAPY: CPT | Performed by: PHYSICAL THERAPIST

## 2017-10-25 PROCEDURE — G8979 MOBILITY GOAL STATUS: HCPCS | Performed by: PHYSICAL THERAPIST

## 2017-10-25 PROCEDURE — 97162 PT EVAL MOD COMPLEX 30 MIN: CPT | Performed by: PHYSICAL THERAPIST

## 2017-10-25 PROCEDURE — 97110 THERAPEUTIC EXERCISES: CPT | Performed by: PHYSICAL THERAPIST

## 2017-10-25 ASSESSMENT — MOVEMENT AND STRENGTH ASSESSMENTS
WALKING BETWEEN ROOMS: A LITTLE BIT OF DIFFICULTY
YOUR USUAL HOBBIES, RECREATIONAL OR SPORTING ACTIVIITIES: A LITTLE BIT OF DIFFICULTY
GOING UP OR DOWN 10 STAIRS (ABOUT 1 FLIGHT OF STAIRS): EXTREME DIFFICULTY OR UNABLE TO PERFORM ACTIVITY
ANY OF YOUR USUAL WORK, HOUSEWORK OR SCHOOL ACTIVITIES: A LITTLE BIT OF DIFFICULTY
GETTING INTO OR OUT OF THE BATH: EXTREME DIFFICULTY OR UNABLE TO PERFORM ACTIVITY
SITTING FOR 1 HOUR: NO DIFFICULTY
RUNNING ON EVEN GROUND: EXTREME DIFFICULTY OR UNABLE TO PERFORM ACTIVITY
LIFTING AN OBJECT, LIKE A BAG OF GROCERIES, FROM THE FLOOR: EXTREME DIFFICULTY OR UNABLE TO PERFORM ACTIVITY
PERFORMING LIGHT ACTIVITES AROUND YOUR HOME: A LITTLE BIT OF DIFFICULTY
MAKING SHARP TURNS WHILE RUNNING FAST: EX
ROLLING OVER IN BED: A LITTLE BIT OF DIFFICULTY
WALKING A MILE: A LITTLE BIT OF DIFFICULTY
SQUATTING: EXTREME DIFFICULTY OR UNABLE TO PERFORM ACTIVITY
HOPPING: EXTREME DIFFICULTY OR UNABLE TO PERFORM ACTIVITY
TOTAL SCORE: 45
PERFORMING HEAVY ACTIVITIES AROUND YOUR HOME: EXTREME DIFFICULTY OR UNABLE TO PERFORM ACTIVITY
WALKING 2 BLOCKS: NO DIFFICULTY
PUTTING ON YOUR SHOES OR SOCKS: A LITTLE BIT OF DIFFICULTY
STANDING FOR 1 HOUR: A LITTLE BIT OF DIFFICULTY
GETTING INTO OR OUT OF A CAR: NO DIFFICULTY
RUNNING ON UNEVEN GROUND: EXTREME DIFFICULTY OR UNABLE TO PERFORM ACTIVITY

## 2017-10-26 ENCOUNTER — APPOINTMENT (OUTPATIENT)
Dept: REHABILITATION | Age: 67
End: 2017-10-26
Attending: ORTHOPAEDIC SURGERY

## 2017-10-27 ENCOUNTER — HOSPITAL ENCOUNTER (OUTPATIENT)
Dept: REHABILITATION | Age: 67
Discharge: STILL ADMITTED OR ADMINISTRATIVE | End: 2017-10-27
Attending: ORTHOPAEDIC SURGERY

## 2017-10-27 PROCEDURE — 97140 MANUAL THERAPY 1/> REGIONS: CPT | Performed by: PHYSICAL THERAPIST

## 2017-10-27 PROCEDURE — 97110 THERAPEUTIC EXERCISES: CPT | Performed by: PHYSICAL THERAPIST

## 2017-10-27 PROCEDURE — 10004173 HB COUNTER-THERAPY VISIT PT: Performed by: PHYSICAL THERAPIST

## 2017-10-30 ENCOUNTER — HOSPITAL ENCOUNTER (OUTPATIENT)
Dept: REHABILITATION | Age: 67
Discharge: STILL ADMITTED OR ADMINISTRATIVE | End: 2017-10-30
Attending: ORTHOPAEDIC SURGERY

## 2017-10-30 PROCEDURE — 97140 MANUAL THERAPY 1/> REGIONS: CPT | Performed by: PHYSICAL THERAPIST

## 2017-10-30 PROCEDURE — 97110 THERAPEUTIC EXERCISES: CPT | Performed by: PHYSICAL THERAPIST

## 2017-10-30 PROCEDURE — 10004173 HB COUNTER-THERAPY VISIT PT: Performed by: PHYSICAL THERAPIST

## 2017-11-02 ENCOUNTER — HOSPITAL ENCOUNTER (OUTPATIENT)
Dept: REHABILITATION | Age: 67
Discharge: STILL ADMITTED OR ADMINISTRATIVE | End: 2017-11-02
Attending: ORTHOPAEDIC SURGERY

## 2017-11-02 PROCEDURE — 97140 MANUAL THERAPY 1/> REGIONS: CPT | Performed by: PHYSICAL THERAPIST

## 2017-11-02 PROCEDURE — 10004173 HB COUNTER-THERAPY VISIT PT: Performed by: PHYSICAL THERAPIST

## 2017-11-02 PROCEDURE — 97110 THERAPEUTIC EXERCISES: CPT | Performed by: PHYSICAL THERAPIST

## 2017-11-03 RX ORDER — FLUOXETINE HYDROCHLORIDE 20 MG/1
20 CAPSULE ORAL DAILY
Qty: 90 CAPSULE | Refills: 3 | Status: SHIPPED | OUTPATIENT
Start: 2017-11-03 | End: 2018-12-10 | Stop reason: SDUPTHER

## 2017-11-03 RX ORDER — ZOLPIDEM TARTRATE 10 MG/1
TABLET ORAL
Qty: 30 TABLET | Refills: 3 | Status: SHIPPED
Start: 2017-11-03 | End: 2018-01-29 | Stop reason: SDUPTHER

## 2017-11-06 ENCOUNTER — HOSPITAL ENCOUNTER (OUTPATIENT)
Dept: REHABILITATION | Age: 67
Discharge: STILL ADMITTED OR ADMINISTRATIVE | End: 2017-11-06
Attending: ORTHOPAEDIC SURGERY

## 2017-11-06 PROCEDURE — 10004173 HB COUNTER-THERAPY VISIT PT: Performed by: PHYSICAL THERAPIST

## 2017-11-06 PROCEDURE — 97140 MANUAL THERAPY 1/> REGIONS: CPT | Performed by: PHYSICAL THERAPIST

## 2017-11-06 PROCEDURE — 97110 THERAPEUTIC EXERCISES: CPT | Performed by: PHYSICAL THERAPIST

## 2017-11-07 ENCOUNTER — APPOINTMENT (OUTPATIENT)
Dept: REHABILITATION | Age: 67
End: 2017-11-07
Attending: ORTHOPAEDIC SURGERY

## 2017-11-09 ENCOUNTER — HOSPITAL ENCOUNTER (OUTPATIENT)
Dept: REHABILITATION | Age: 67
Discharge: STILL ADMITTED OR ADMINISTRATIVE | End: 2017-11-09
Attending: ORTHOPAEDIC SURGERY

## 2017-11-09 PROCEDURE — 97110 THERAPEUTIC EXERCISES: CPT | Performed by: PHYSICAL THERAPIST

## 2017-11-09 PROCEDURE — 10004173 HB COUNTER-THERAPY VISIT PT: Performed by: PHYSICAL THERAPIST

## 2017-11-09 PROCEDURE — 97140 MANUAL THERAPY 1/> REGIONS: CPT | Performed by: PHYSICAL THERAPIST

## 2017-11-13 ENCOUNTER — HOSPITAL ENCOUNTER (OUTPATIENT)
Dept: REHABILITATION | Age: 67
Discharge: STILL ADMITTED OR ADMINISTRATIVE | End: 2017-11-13
Attending: ORTHOPAEDIC SURGERY

## 2017-11-13 PROCEDURE — 97140 MANUAL THERAPY 1/> REGIONS: CPT | Performed by: PHYSICAL THERAPIST

## 2017-11-13 PROCEDURE — 10004173 HB COUNTER-THERAPY VISIT PT: Performed by: PHYSICAL THERAPIST

## 2017-11-13 PROCEDURE — 97110 THERAPEUTIC EXERCISES: CPT | Performed by: PHYSICAL THERAPIST

## 2017-11-16 ENCOUNTER — HOSPITAL ENCOUNTER (OUTPATIENT)
Dept: REHABILITATION | Age: 67
Discharge: STILL ADMITTED OR ADMINISTRATIVE | End: 2017-11-16
Attending: ORTHOPAEDIC SURGERY

## 2017-11-16 PROCEDURE — 97140 MANUAL THERAPY 1/> REGIONS: CPT | Performed by: PHYSICAL THERAPIST

## 2017-11-16 PROCEDURE — 10004173 HB COUNTER-THERAPY VISIT PT: Performed by: PHYSICAL THERAPIST

## 2017-11-16 PROCEDURE — 97110 THERAPEUTIC EXERCISES: CPT | Performed by: PHYSICAL THERAPIST

## 2017-11-20 ENCOUNTER — HOSPITAL ENCOUNTER (OUTPATIENT)
Dept: REHABILITATION | Age: 67
Discharge: STILL ADMITTED OR ADMINISTRATIVE | End: 2017-11-20
Attending: ORTHOPAEDIC SURGERY

## 2017-11-20 PROCEDURE — 97110 THERAPEUTIC EXERCISES: CPT | Performed by: PHYSICAL THERAPY ASSISTANT

## 2017-11-20 PROCEDURE — 97140 MANUAL THERAPY 1/> REGIONS: CPT | Performed by: PHYSICAL THERAPY ASSISTANT

## 2017-11-20 PROCEDURE — 10004173 HB COUNTER-THERAPY VISIT PT: Performed by: PHYSICAL THERAPY ASSISTANT

## 2017-11-22 ENCOUNTER — HOSPITAL ENCOUNTER (OUTPATIENT)
Dept: REHABILITATION | Age: 67
Discharge: STILL ADMITTED OR ADMINISTRATIVE | End: 2017-11-22
Attending: ORTHOPAEDIC SURGERY

## 2017-11-22 PROCEDURE — 97110 THERAPEUTIC EXERCISES: CPT | Performed by: PHYSICAL THERAPY ASSISTANT

## 2017-11-22 PROCEDURE — G8978 MOBILITY CURRENT STATUS: HCPCS | Performed by: PHYSICAL THERAPIST

## 2017-11-22 PROCEDURE — G8979 MOBILITY GOAL STATUS: HCPCS | Performed by: PHYSICAL THERAPIST

## 2017-11-22 PROCEDURE — 97530 THERAPEUTIC ACTIVITIES: CPT | Performed by: PHYSICAL THERAPY ASSISTANT

## 2017-11-22 PROCEDURE — 10004173 HB COUNTER-THERAPY VISIT PT: Performed by: PHYSICAL THERAPY ASSISTANT

## 2017-11-22 PROCEDURE — 97140 MANUAL THERAPY 1/> REGIONS: CPT | Performed by: PHYSICAL THERAPY ASSISTANT

## 2017-11-27 ENCOUNTER — HOSPITAL ENCOUNTER (OUTPATIENT)
Dept: REHABILITATION | Age: 67
Discharge: STILL ADMITTED OR ADMINISTRATIVE | End: 2017-11-27
Attending: ORTHOPAEDIC SURGERY

## 2017-11-27 PROCEDURE — 97110 THERAPEUTIC EXERCISES: CPT | Performed by: PHYSICAL THERAPIST

## 2017-11-27 PROCEDURE — 97140 MANUAL THERAPY 1/> REGIONS: CPT | Performed by: PHYSICAL THERAPIST

## 2017-11-27 PROCEDURE — 10004173 HB COUNTER-THERAPY VISIT PT: Performed by: PHYSICAL THERAPIST

## 2017-11-30 ENCOUNTER — APPOINTMENT (OUTPATIENT)
Dept: REHABILITATION | Age: 67
End: 2017-11-30
Attending: ORTHOPAEDIC SURGERY

## 2017-12-05 ENCOUNTER — APPOINTMENT (OUTPATIENT)
Dept: REHABILITATION | Age: 67
End: 2017-12-05
Attending: ORTHOPAEDIC SURGERY

## 2017-12-08 ENCOUNTER — HOSPITAL ENCOUNTER (OUTPATIENT)
Dept: REHABILITATION | Age: 67
Discharge: STILL ADMITTED OR ADMINISTRATIVE | End: 2017-12-08
Attending: ORTHOPAEDIC SURGERY

## 2017-12-08 PROCEDURE — 97110 THERAPEUTIC EXERCISES: CPT | Performed by: PHYSICAL THERAPIST

## 2017-12-08 PROCEDURE — 97140 MANUAL THERAPY 1/> REGIONS: CPT | Performed by: PHYSICAL THERAPIST

## 2017-12-08 PROCEDURE — 10004173 HB COUNTER-THERAPY VISIT PT: Performed by: PHYSICAL THERAPIST

## 2017-12-11 ENCOUNTER — HOSPITAL ENCOUNTER (OUTPATIENT)
Dept: REHABILITATION | Age: 67
Discharge: STILL ADMITTED OR ADMINISTRATIVE | End: 2017-12-11
Attending: ORTHOPAEDIC SURGERY

## 2017-12-11 PROCEDURE — 97110 THERAPEUTIC EXERCISES: CPT | Performed by: PHYSICAL THERAPIST

## 2017-12-11 PROCEDURE — 97140 MANUAL THERAPY 1/> REGIONS: CPT | Performed by: PHYSICAL THERAPIST

## 2017-12-11 PROCEDURE — 10004173 HB COUNTER-THERAPY VISIT PT: Performed by: PHYSICAL THERAPIST

## 2017-12-12 ENCOUNTER — OFFICE VISIT (OUTPATIENT)
Dept: ORTHOPEDICS | Age: 67
End: 2017-12-12

## 2017-12-12 DIAGNOSIS — Z98.890 STATUS POST TENDON REPAIR: Primary | ICD-10-CM

## 2017-12-12 PROCEDURE — 99213 OFFICE O/P EST LOW 20 MIN: CPT | Performed by: ORTHOPAEDIC SURGERY

## 2017-12-14 ENCOUNTER — HOSPITAL ENCOUNTER (OUTPATIENT)
Dept: REHABILITATION | Age: 67
Discharge: STILL ADMITTED OR ADMINISTRATIVE | End: 2017-12-14
Attending: ORTHOPAEDIC SURGERY

## 2017-12-14 PROCEDURE — 97110 THERAPEUTIC EXERCISES: CPT | Performed by: PHYSICAL THERAPIST

## 2017-12-14 PROCEDURE — 10004173 HB COUNTER-THERAPY VISIT PT: Performed by: PHYSICAL THERAPIST

## 2017-12-14 PROCEDURE — 97140 MANUAL THERAPY 1/> REGIONS: CPT | Performed by: PHYSICAL THERAPIST

## 2017-12-18 ENCOUNTER — APPOINTMENT (OUTPATIENT)
Dept: REHABILITATION | Age: 67
End: 2017-12-18
Attending: ORTHOPAEDIC SURGERY

## 2017-12-19 ENCOUNTER — APPOINTMENT (OUTPATIENT)
Dept: REHABILITATION | Age: 67
End: 2017-12-19
Attending: ORTHOPAEDIC SURGERY

## 2017-12-21 ENCOUNTER — APPOINTMENT (OUTPATIENT)
Dept: REHABILITATION | Age: 67
End: 2017-12-21
Attending: ORTHOPAEDIC SURGERY

## 2017-12-26 ENCOUNTER — APPOINTMENT (OUTPATIENT)
Dept: REHABILITATION | Age: 67
End: 2017-12-26
Attending: ORTHOPAEDIC SURGERY

## 2017-12-28 ENCOUNTER — APPOINTMENT (OUTPATIENT)
Dept: REHABILITATION | Age: 67
End: 2017-12-28
Attending: ORTHOPAEDIC SURGERY

## 2018-01-01 ENCOUNTER — EXTERNAL RECORD (OUTPATIENT)
Dept: OTHER | Age: 68
End: 2018-01-01

## 2018-01-29 ENCOUNTER — TELEPHONE (OUTPATIENT)
Dept: FAMILY MEDICINE | Age: 68
End: 2018-01-29

## 2018-01-29 RX ORDER — CLONAZEPAM 1 MG/1
TABLET ORAL
Qty: 60 TABLET | Refills: 4 | Status: SHIPPED | OUTPATIENT
Start: 2018-01-29 | End: 2018-06-13 | Stop reason: SDUPTHER

## 2018-01-29 RX ORDER — ZOLPIDEM TARTRATE 10 MG/1
10 TABLET ORAL NIGHTLY PRN
Qty: 30 TABLET | Refills: 4 | Status: SHIPPED | OUTPATIENT
Start: 2018-01-29 | End: 2018-06-13 | Stop reason: SDUPTHER

## 2018-06-14 RX ORDER — ZOLPIDEM TARTRATE 10 MG/1
10 TABLET ORAL NIGHTLY PRN
Qty: 30 TABLET | Refills: 4 | Status: SHIPPED | OUTPATIENT
Start: 2018-06-14 | End: 2018-08-14 | Stop reason: SDUPTHER

## 2018-06-14 RX ORDER — CLONAZEPAM 1 MG/1
TABLET ORAL
Qty: 60 TABLET | Refills: 4 | Status: SHIPPED | OUTPATIENT
Start: 2018-06-14 | End: 2018-08-14 | Stop reason: SDUPTHER

## 2018-06-22 ENCOUNTER — TELEPHONE (OUTPATIENT)
Dept: ORTHOPEDICS | Age: 68
End: 2018-06-22

## 2018-06-22 DIAGNOSIS — Z96.642 STATUS POST LEFT HIP REPLACEMENT: Primary | ICD-10-CM

## 2018-06-22 DIAGNOSIS — T56.894D TOXIC EFFECT OF OTHER METALS, UNDETERMINED, SUBSEQUENT ENCOUNTER: ICD-10-CM

## 2018-06-22 DIAGNOSIS — T56.2X4D TOXIC EFFECT OF CHROMIUM AND ITS COMPOUNDS, UNDETERMINED, SUBSEQUENT ENCOUNTER: ICD-10-CM

## 2018-08-02 ENCOUNTER — HOSPITAL ENCOUNTER (OUTPATIENT)
Dept: MRI IMAGING | Age: 68
Discharge: HOME | End: 2018-08-02
Attending: ORTHOPAEDIC SURGERY

## 2018-08-02 DIAGNOSIS — Z96.642 STATUS POST LEFT HIP REPLACEMENT: ICD-10-CM

## 2018-08-02 PROCEDURE — 73721 MRI JNT OF LWR EXTRE W/O DYE: CPT | Performed by: RADIOLOGY

## 2018-08-02 PROCEDURE — 73721 MRI JNT OF LWR EXTRE W/O DYE: CPT

## 2018-08-07 ENCOUNTER — LAB SERVICES (OUTPATIENT)
Dept: LAB | Age: 68
End: 2018-08-07

## 2018-08-07 ENCOUNTER — TELEPHONE (OUTPATIENT)
Dept: FAMILY MEDICINE | Age: 68
End: 2018-08-07

## 2018-08-07 DIAGNOSIS — I10 BENIGN ESSENTIAL HYPERTENSION: Primary | ICD-10-CM

## 2018-08-07 DIAGNOSIS — T56.2X4D TOXIC EFFECT OF CHROMIUM AND ITS COMPOUNDS, UNDETERMINED, SUBSEQUENT ENCOUNTER: ICD-10-CM

## 2018-08-07 DIAGNOSIS — T56.894D TOXIC EFFECT OF OTHER METALS, UNDETERMINED, SUBSEQUENT ENCOUNTER: ICD-10-CM

## 2018-08-07 DIAGNOSIS — Z96.642 STATUS POST LEFT HIP REPLACEMENT: ICD-10-CM

## 2018-08-07 PROCEDURE — 36415 COLL VENOUS BLD VENIPUNCTURE: CPT | Performed by: INTERNAL MEDICINE

## 2018-08-10 LAB
COBALT SERPL-MCNC: 4.2 UG/L
CR SERPL-MCNC: 2.7 NG/ML

## 2018-08-14 ENCOUNTER — OFFICE VISIT (OUTPATIENT)
Dept: ORTHOPEDICS | Age: 68
End: 2018-08-14

## 2018-08-14 ENCOUNTER — OFFICE VISIT (OUTPATIENT)
Dept: FAMILY MEDICINE | Age: 68
End: 2018-08-14

## 2018-08-14 ENCOUNTER — IMAGING SERVICES (OUTPATIENT)
Dept: GENERAL RADIOLOGY | Age: 68
End: 2018-08-14
Attending: ORTHOPAEDIC SURGERY

## 2018-08-14 VITALS
HEIGHT: 65 IN | HEART RATE: 62 BPM | WEIGHT: 169.8 LBS | BODY MASS INDEX: 28.29 KG/M2 | OXYGEN SATURATION: 98 % | SYSTOLIC BLOOD PRESSURE: 130 MMHG | DIASTOLIC BLOOD PRESSURE: 82 MMHG

## 2018-08-14 VITALS — DIASTOLIC BLOOD PRESSURE: 86 MMHG | HEART RATE: 68 BPM | SYSTOLIC BLOOD PRESSURE: 126 MMHG

## 2018-08-14 DIAGNOSIS — Z96.642 STATUS POST LEFT HIP REPLACEMENT: ICD-10-CM

## 2018-08-14 DIAGNOSIS — Z12.4 SCREENING FOR CERVICAL CANCER: ICD-10-CM

## 2018-08-14 DIAGNOSIS — I10 BENIGN ESSENTIAL HYPERTENSION: ICD-10-CM

## 2018-08-14 DIAGNOSIS — Z98.890 STATUS POST TENDON REPAIR: Primary | ICD-10-CM

## 2018-08-14 DIAGNOSIS — K21.9 GASTROESOPHAGEAL REFLUX DISEASE, ESOPHAGITIS PRESENCE NOT SPECIFIED: ICD-10-CM

## 2018-08-14 DIAGNOSIS — F32.A DEPRESSIVE DISORDER: ICD-10-CM

## 2018-08-14 DIAGNOSIS — Z12.12 SCREENING FOR COLORECTAL CANCER: ICD-10-CM

## 2018-08-14 DIAGNOSIS — Z01.419 ENCOUNTER FOR GYNECOLOGICAL EXAMINATION WITHOUT ABNORMAL FINDING: ICD-10-CM

## 2018-08-14 DIAGNOSIS — Z00.00 MEDICARE WELCOME VISIT: Primary | ICD-10-CM

## 2018-08-14 DIAGNOSIS — Z96.651 STATUS POST TOTAL RIGHT KNEE REPLACEMENT: ICD-10-CM

## 2018-08-14 DIAGNOSIS — M25.569 KNEE PAIN, UNSPECIFIED CHRONICITY, UNSPECIFIED LATERALITY: ICD-10-CM

## 2018-08-14 DIAGNOSIS — Z98.890 STATUS POST TENDON REPAIR: ICD-10-CM

## 2018-08-14 DIAGNOSIS — E78.2 HYPERLIPIDEMIA, MIXED: ICD-10-CM

## 2018-08-14 DIAGNOSIS — R13.19 ESOPHAGEAL DYSPHAGIA: ICD-10-CM

## 2018-08-14 DIAGNOSIS — Z23 NEED FOR VACCINATION: ICD-10-CM

## 2018-08-14 DIAGNOSIS — R07.2 PRECORDIAL PAIN: ICD-10-CM

## 2018-08-14 DIAGNOSIS — M25.559 ARTHRALGIA OF HIP, UNSPECIFIED LATERALITY: ICD-10-CM

## 2018-08-14 DIAGNOSIS — Z78.0 POSTMENOPAUSAL STATUS (AGE-RELATED) (NATURAL): ICD-10-CM

## 2018-08-14 DIAGNOSIS — Z12.11 SCREENING FOR COLORECTAL CANCER: ICD-10-CM

## 2018-08-14 PROCEDURE — G0438 PPPS, INITIAL VISIT: HCPCS | Performed by: FAMILY MEDICINE

## 2018-08-14 PROCEDURE — 73562 X-RAY EXAM OF KNEE 3: CPT | Performed by: RADIOLOGY

## 2018-08-14 PROCEDURE — 90670 PCV13 VACCINE IM: CPT | Performed by: FAMILY MEDICINE

## 2018-08-14 PROCEDURE — 99213 OFFICE O/P EST LOW 20 MIN: CPT | Performed by: ORTHOPAEDIC SURGERY

## 2018-08-14 PROCEDURE — 73502 X-RAY EXAM HIP UNI 2-3 VIEWS: CPT | Performed by: RADIOLOGY

## 2018-08-14 PROCEDURE — 99214 OFFICE O/P EST MOD 30 MIN: CPT | Performed by: FAMILY MEDICINE

## 2018-08-14 PROCEDURE — G0009 ADMIN PNEUMOCOCCAL VACCINE: HCPCS | Performed by: FAMILY MEDICINE

## 2018-08-14 RX ORDER — CLONAZEPAM 1 MG/1
TABLET ORAL
Qty: 60 TABLET | Refills: 0 | Status: SHIPPED | OUTPATIENT
Start: 2018-08-14 | End: 2018-12-05 | Stop reason: SDUPTHER

## 2018-08-14 RX ORDER — ZOLPIDEM TARTRATE 10 MG/1
10 TABLET ORAL NIGHTLY PRN
Qty: 30 TABLET | Refills: 0 | Status: SHIPPED | OUTPATIENT
Start: 2018-08-14 | End: 2018-12-05 | Stop reason: SDUPTHER

## 2018-08-14 RX ORDER — OMEPRAZOLE 20 MG/1
20 TABLET, DELAYED RELEASE ORAL DAILY
Qty: 30 TABLET | Refills: 11 | Status: SHIPPED | COMMUNITY
Start: 2018-08-14 | End: 2018-08-15 | Stop reason: SDUPTHER

## 2018-08-14 RX ORDER — KETOCONAZOLE 20 MG/G
CREAM TOPICAL
Qty: 60 G | Refills: 2 | Status: SHIPPED | OUTPATIENT
Start: 2018-08-14

## 2018-08-14 ASSESSMENT — PATIENT HEALTH QUESTIONNAIRE - PHQ9
SUM OF ALL RESPONSES TO PHQ9 QUESTIONS 1 TO 9: 3
SUM OF ALL RESPONSES TO PHQ QUESTIONS 1-9: 3
SUM OF ALL RESPONSES TO PHQ9 QUESTIONS 1 AND 2: 0
5. POOR APPETITE OR OVEREATING: NOT AT ALL
CLINICAL INTERPRETATION OF PHQ2 SCORE: 0

## 2018-08-14 ASSESSMENT — VISUAL ACUITY
OS_CC: 20/25
OD_CC: 20/20

## 2018-08-15 ENCOUNTER — TELEPHONE (OUTPATIENT)
Dept: ADMISSION | Age: 68
End: 2018-08-15

## 2018-08-15 RX ORDER — OMEPRAZOLE 20 MG/1
20 TABLET, DELAYED RELEASE ORAL DAILY
Qty: 30 TABLET | Refills: 11 | Status: SHIPPED | COMMUNITY
Start: 2018-08-15

## 2018-08-16 ENCOUNTER — TELEPHONE (OUTPATIENT)
Dept: ADMISSION | Age: 68
End: 2018-08-16

## 2018-08-16 LAB — PATH REPORT, THINPREP: NORMAL

## 2018-08-17 ENCOUNTER — TELEPHONE (OUTPATIENT)
Dept: FAMILY MEDICINE | Age: 68
End: 2018-08-17

## 2018-08-22 ENCOUNTER — IMAGING SERVICES (OUTPATIENT)
Dept: OBGYN | Age: 68
End: 2018-08-22
Attending: FAMILY MEDICINE

## 2018-08-22 ENCOUNTER — LAB SERVICES (OUTPATIENT)
Dept: LAB | Age: 68
End: 2018-08-22

## 2018-08-22 DIAGNOSIS — Z78.0 POSTMENOPAUSAL STATUS (AGE-RELATED) (NATURAL): ICD-10-CM

## 2018-08-22 DIAGNOSIS — Z12.31 ENCOUNTER FOR SCREENING MAMMOGRAM FOR BREAST CANCER: ICD-10-CM

## 2018-08-22 DIAGNOSIS — E78.2 HYPERLIPIDEMIA, MIXED: ICD-10-CM

## 2018-08-22 LAB
ALBUMIN SERPL-MCNC: 3.6 G/DL (ref 3.6–5.1)
ALBUMIN/GLOB SERPL: 1 {RATIO} (ref 1–2.4)
ALP SERPL-CCNC: 122 UNITS/L (ref 45–117)
ALT SERPL-CCNC: 17 UNITS/L
ANION GAP SERPL CALC-SCNC: 12 MMOL/L (ref 10–20)
AST SERPL-CCNC: 15 UNITS/L
BILIRUB SERPL-MCNC: 0.3 MG/DL (ref 0.2–1)
BUN SERPL-MCNC: 16 MG/DL (ref 6–20)
BUN/CREAT SERPL: 20 (ref 7–25)
CALCIUM SERPL-MCNC: 8.5 MG/DL (ref 8.4–10.2)
CHLORIDE SERPL-SCNC: 105 MMOL/L (ref 98–107)
CHOLEST SERPL-MCNC: 188 MG/DL
CHOLEST/HDLC SERPL: 2.9 {RATIO}
CO2 SERPL-SCNC: 28 MMOL/L (ref 21–32)
CREAT SERPL-MCNC: 0.82 MG/DL (ref 0.51–0.95)
FASTING STATUS PATIENT QL REPORTED: 0 HRS
GLOBULIN SER-MCNC: 3.5 G/DL (ref 2–4)
GLUCOSE SERPL-MCNC: 98 MG/DL (ref 65–99)
HDLC SERPL-MCNC: 65 MG/DL
LDLC SERPL-MCNC: 107 MG/DL
LENGTH OF FAST TIME PATIENT: 0 HRS
NONHDLC SERPL-MCNC: 123 MG/DL
POTASSIUM SERPL-SCNC: 4.8 MMOL/L (ref 3.4–5.1)
PROT SERPL-MCNC: 7.1 G/DL (ref 6.4–8.2)
SODIUM SERPL-SCNC: 140 MMOL/L (ref 135–145)
TRIGL SERPL-MCNC: 80 MG/DL

## 2018-08-22 PROCEDURE — 77063 BREAST TOMOSYNTHESIS BI: CPT

## 2018-08-22 PROCEDURE — 77080 DXA BONE DENSITY AXIAL: CPT

## 2018-08-22 PROCEDURE — 77063 BREAST TOMOSYNTHESIS BI: CPT | Performed by: RADIOLOGY

## 2018-08-22 PROCEDURE — 36415 COLL VENOUS BLD VENIPUNCTURE: CPT | Performed by: INTERNAL MEDICINE

## 2018-08-22 PROCEDURE — 3341F MAMMO ASSESS NEGATIVE DOCD: CPT | Performed by: RADIOLOGY

## 2018-08-22 PROCEDURE — 77080 DXA BONE DENSITY AXIAL: CPT | Performed by: RADIOLOGY

## 2018-08-22 PROCEDURE — 77067 SCR MAMMO BI INCL CAD: CPT | Performed by: RADIOLOGY

## 2018-08-23 ENCOUNTER — HOSPITAL ENCOUNTER (OUTPATIENT)
Dept: NUCLEAR MEDICINE | Age: 68
Discharge: HOME | End: 2018-08-23
Attending: FAMILY MEDICINE

## 2018-08-23 ENCOUNTER — TELEPHONE (OUTPATIENT)
Dept: FAMILY MEDICINE | Age: 68
End: 2018-08-23

## 2018-08-23 DIAGNOSIS — R07.2 PRECORDIAL PAIN: ICD-10-CM

## 2018-08-23 LAB — REPORT TEXT: NORMAL

## 2018-08-23 PROCEDURE — A9502 TC99M TETROFOSMIN: HCPCS

## 2018-08-23 PROCEDURE — 10002800 HB RX 250 W HCPCS: Performed by: NURSE PRACTITIONER

## 2018-08-23 PROCEDURE — 93017 CV STRESS TEST TRACING ONLY: CPT

## 2018-08-23 PROCEDURE — 78452 HT MUSCLE IMAGE SPECT MULT: CPT | Performed by: INTERNAL MEDICINE

## 2018-08-23 RX ORDER — REGADENOSON 0.08 MG/ML
0.4 INJECTION, SOLUTION INTRAVENOUS ONCE
Status: COMPLETED | OUTPATIENT
Start: 2018-08-23 | End: 2018-08-23

## 2018-08-23 RX ADMIN — REGADENOSON 0.4 MG: 0.08 INJECTION, SOLUTION INTRAVENOUS at 11:30

## 2018-09-12 RX ORDER — LOSARTAN POTASSIUM 100 MG/1
100 TABLET ORAL DAILY
Qty: 30 TABLET | Refills: 11 | Status: SHIPPED | OUTPATIENT
Start: 2018-09-12

## 2018-11-07 ENCOUNTER — TELEPHONE (OUTPATIENT)
Dept: FAMILY MEDICINE | Age: 68
End: 2018-11-07

## 2018-12-05 RX ORDER — ZOLPIDEM TARTRATE 10 MG/1
10 TABLET ORAL NIGHTLY PRN
Qty: 30 TABLET | Refills: 4 | Status: SHIPPED | OUTPATIENT
Start: 2018-12-05

## 2018-12-05 RX ORDER — FLUOXETINE HYDROCHLORIDE 20 MG/1
20 CAPSULE ORAL DAILY
Qty: 90 CAPSULE | Refills: 3 | OUTPATIENT
Start: 2018-12-05

## 2018-12-05 RX ORDER — CLONAZEPAM 1 MG/1
TABLET ORAL
Qty: 60 TABLET | Refills: 4 | Status: SHIPPED | OUTPATIENT
Start: 2018-12-05 | End: 2019-08-20 | Stop reason: ALTCHOICE

## 2018-12-10 RX ORDER — FLUOXETINE HYDROCHLORIDE 20 MG/1
20 CAPSULE ORAL DAILY
Qty: 90 CAPSULE | Refills: 2 | Status: SHIPPED | OUTPATIENT
Start: 2018-12-10

## 2019-04-17 ENCOUNTER — E-ADVICE (OUTPATIENT)
Dept: FAMILY MEDICINE | Age: 69
End: 2019-04-17

## 2019-06-18 ENCOUNTER — TELEPHONE (OUTPATIENT)
Dept: ORTHOPEDICS | Age: 69
End: 2019-06-18

## 2019-06-18 DIAGNOSIS — T56.2X4D TOXIC EFFECT OF CHROMIUM AND ITS COMPOUNDS, UNDETERMINED, SUBSEQUENT ENCOUNTER: ICD-10-CM

## 2019-06-18 DIAGNOSIS — Z96.642 STATUS POST LEFT HIP REPLACEMENT: Primary | ICD-10-CM

## 2019-06-18 DIAGNOSIS — Z96.651 STATUS POST TOTAL RIGHT KNEE REPLACEMENT: ICD-10-CM

## 2019-06-18 DIAGNOSIS — Z96.659 HISTORY OF TOTAL KNEE REPLACEMENT, UNSPECIFIED LATERALITY: ICD-10-CM

## 2019-06-18 DIAGNOSIS — M25.552 LEFT HIP PAIN: ICD-10-CM

## 2019-06-18 DIAGNOSIS — T56.894D TOXIC EFFECT OF OTHER METALS, UNDETERMINED, SUBSEQUENT ENCOUNTER: ICD-10-CM

## 2019-06-18 RX ORDER — CEPHALEXIN 500 MG/1
CAPSULE ORAL
Qty: 20 CAPSULE | Refills: 2 | Status: SHIPPED | OUTPATIENT
Start: 2019-06-18

## 2019-06-18 RX ORDER — CEPHALEXIN 500 MG/1
CAPSULE ORAL
Qty: 20 CAPSULE | Refills: 2 | Status: CANCELLED | OUTPATIENT
Start: 2019-06-18

## 2019-07-18 ENCOUNTER — TELEPHONE (OUTPATIENT)
Dept: FAMILY MEDICINE | Age: 69
End: 2019-07-18

## 2019-08-14 ENCOUNTER — LAB SERVICES (OUTPATIENT)
Dept: LAB | Age: 69
End: 2019-08-14

## 2019-08-14 ENCOUNTER — TELEPHONE (OUTPATIENT)
Dept: ORTHOPEDICS | Age: 69
End: 2019-08-14

## 2019-08-14 ENCOUNTER — HOSPITAL ENCOUNTER (OUTPATIENT)
Dept: MRI IMAGING | Age: 69
Discharge: HOME | End: 2019-08-14
Attending: ORTHOPAEDIC SURGERY

## 2019-08-14 DIAGNOSIS — M25.552 LEFT HIP PAIN: ICD-10-CM

## 2019-08-14 DIAGNOSIS — T56.894D TOXIC EFFECT OF OTHER METALS, UNDETERMINED, SUBSEQUENT ENCOUNTER: ICD-10-CM

## 2019-08-14 DIAGNOSIS — Z96.642 STATUS POST LEFT HIP REPLACEMENT: ICD-10-CM

## 2019-08-14 DIAGNOSIS — T56.2X4D TOXIC EFFECT OF CHROMIUM AND ITS COMPOUNDS, UNDETERMINED, SUBSEQUENT ENCOUNTER: ICD-10-CM

## 2019-08-14 PROCEDURE — 73721 MRI JNT OF LWR EXTRE W/O DYE: CPT | Performed by: RADIOLOGY

## 2019-08-14 PROCEDURE — 36415 COLL VENOUS BLD VENIPUNCTURE: CPT | Performed by: INTERNAL MEDICINE

## 2019-08-14 PROCEDURE — 73721 MRI JNT OF LWR EXTRE W/O DYE: CPT

## 2019-08-17 LAB — COBALT BLD-MCNC: 2.3 UG/L

## 2019-08-19 LAB — CR BLD-MCNC: 0.8 UG/L (ref 0.2–0.6)

## 2019-08-20 ENCOUNTER — IMAGING SERVICES (OUTPATIENT)
Dept: GENERAL RADIOLOGY | Age: 69
End: 2019-08-20
Attending: ORTHOPAEDIC SURGERY

## 2019-08-20 ENCOUNTER — OFFICE VISIT (OUTPATIENT)
Dept: ORTHOPEDICS | Age: 69
End: 2019-08-20

## 2019-08-20 ENCOUNTER — TELEPHONE (OUTPATIENT)
Dept: FAMILY MEDICINE | Age: 69
End: 2019-08-20

## 2019-08-20 DIAGNOSIS — Z96.642 STATUS POST LEFT HIP REPLACEMENT: Primary | ICD-10-CM

## 2019-08-20 DIAGNOSIS — Z98.890 STATUS POST TENDON REPAIR: ICD-10-CM

## 2019-08-20 DIAGNOSIS — Z96.651 STATUS POST TOTAL RIGHT KNEE REPLACEMENT: ICD-10-CM

## 2019-08-20 DIAGNOSIS — Z96.642 STATUS POST LEFT HIP REPLACEMENT: ICD-10-CM

## 2019-08-20 PROCEDURE — 99213 OFFICE O/P EST LOW 20 MIN: CPT | Performed by: ORTHOPAEDIC SURGERY

## 2019-08-20 PROCEDURE — 73502 X-RAY EXAM HIP UNI 2-3 VIEWS: CPT | Performed by: RADIOLOGY

## 2019-08-20 RX ORDER — AMOXICILLIN 500 MG
CAPSULE ORAL
COMMUNITY

## 2019-08-20 RX ORDER — ERYTHROMYCIN 500 MG/1
TABLET, COATED ORAL
Refills: 0 | COMMUNITY
Start: 2019-06-13

## 2019-08-20 RX ORDER — CHOLECALCIFEROL (VITAMIN D3) 25 MCG
TABLET,CHEWABLE ORAL
COMMUNITY

## 2019-08-20 RX ORDER — PNV NO.95/FERROUS FUM/FOLIC AC 28MG-0.8MG
325 TABLET ORAL
COMMUNITY

## 2019-08-20 RX ORDER — UBIDECARENONE 30 MG
CAPSULE ORAL
COMMUNITY

## 2019-08-20 RX ORDER — PRAZOSIN HYDROCHLORIDE 1 MG/1
CAPSULE ORAL
Refills: 0 | COMMUNITY
Start: 2019-07-23

## 2019-08-20 RX ORDER — IBUPROFEN 800 MG/1
800 TABLET ORAL EVERY 6 HOURS PRN
COMMUNITY

## 2019-08-20 RX ORDER — CLONAZEPAM 0.5 MG/1
TABLET ORAL
Refills: 0 | COMMUNITY
Start: 2019-08-17

## 2020-01-15 ENCOUNTER — TELEPHONE (OUTPATIENT)
Dept: FAMILY MEDICINE | Age: 70
End: 2020-01-15

## 2020-03-03 ENCOUNTER — E-ADVICE (OUTPATIENT)
Dept: FAMILY MEDICINE | Age: 70
End: 2020-03-03

## 2020-06-17 ENCOUNTER — CLINICAL ABSTRACT (OUTPATIENT)
Dept: HEALTH INFORMATION MANAGEMENT | Age: 70
End: 2020-06-17

## 2021-05-18 ENCOUNTER — TELEPHONE (OUTPATIENT)
Dept: FAMILY MEDICINE | Age: 71
End: 2021-05-18

## 2021-08-04 NOTE — TELEPHONE ENCOUNTER
DIAGNOSIS: R Hip pain   APPOINTMENT DATE: 8.5.21   NOTES STATUS DETAILS   OFFICE NOTE from referring provider N/A    OFFICE NOTE from other specialist N/A    DISCHARGE SUMMARY from hospital N/A    DISCHARGE REPORT from the ER N/A    OPERATIVE REPORT N/A    EMG report N/A    MEDICATION LIST N/A    MRI N/A    DEXA (osteoporosis/bone health) N/A    CT SCAN N/A    XRAYS (IMAGES & REPORTS) N/A

## 2021-08-05 ENCOUNTER — OFFICE VISIT (OUTPATIENT)
Dept: ORTHOPEDICS | Facility: CLINIC | Age: 71
End: 2021-08-05
Payer: MEDICARE

## 2021-08-05 ENCOUNTER — PRE VISIT (OUTPATIENT)
Dept: ORTHOPEDICS | Facility: CLINIC | Age: 71
End: 2021-08-05

## 2021-08-05 ENCOUNTER — ANCILLARY PROCEDURE (OUTPATIENT)
Dept: GENERAL RADIOLOGY | Facility: CLINIC | Age: 71
End: 2021-08-05
Attending: ORTHOPAEDIC SURGERY
Payer: MEDICARE

## 2021-08-05 VITALS — HEIGHT: 65 IN | WEIGHT: 181 LBS | BODY MASS INDEX: 30.16 KG/M2

## 2021-08-05 DIAGNOSIS — M25.551 RIGHT HIP PAIN: Primary | ICD-10-CM

## 2021-08-05 PROCEDURE — 73502 X-RAY EXAM HIP UNI 2-3 VIEWS: CPT | Mod: RT | Performed by: RADIOLOGY

## 2021-08-05 PROCEDURE — 99204 OFFICE O/P NEW MOD 45 MIN: CPT | Performed by: ORTHOPAEDIC SURGERY

## 2021-08-05 RX ORDER — MULTIVITAMIN WITH IRON
250 TABLET ORAL
COMMUNITY

## 2021-08-05 RX ORDER — HYDROCHLOROTHIAZIDE 12.5 MG/1
25 CAPSULE ORAL EVERY MORNING
Status: ON HOLD | COMMUNITY
Start: 2021-07-06 | End: 2021-09-30

## 2021-08-05 RX ORDER — AMOXICILLIN 500 MG
CAPSULE ORAL
COMMUNITY

## 2021-08-05 RX ORDER — ALBUTEROL SULFATE 90 UG/1
2 AEROSOL, METERED RESPIRATORY (INHALATION) EVERY 6 HOURS PRN
COMMUNITY
Start: 2020-10-20

## 2021-08-05 RX ORDER — CALCIUM CARBONATE 500(1250)
2 TABLET,CHEWABLE ORAL
Status: ON HOLD | COMMUNITY
End: 2021-09-29

## 2021-08-05 RX ORDER — ZOLPIDEM TARTRATE 10 MG/1
10 TABLET ORAL
COMMUNITY
Start: 2020-10-20 | End: 2024-09-05

## 2021-08-05 RX ORDER — DEXTROMETHORPHAN POLISTIREX 30 MG/5 ML
SUSPENSION, EXTENDED RELEASE 12 HR ORAL
COMMUNITY
Start: 2020-10-20

## 2021-08-05 RX ORDER — LOSARTAN POTASSIUM 100 MG/1
50-100 TABLET ORAL 2 TIMES DAILY
Status: ON HOLD | COMMUNITY
Start: 2020-05-04 | End: 2021-09-30

## 2021-08-05 RX ORDER — UBIDECARENONE 30 MG
CAPSULE ORAL
COMMUNITY

## 2021-08-05 RX ORDER — ESCITALOPRAM OXALATE 10 MG/1
10 TABLET ORAL EVERY MORNING
COMMUNITY
Start: 2021-06-14 | End: 2021-09-29

## 2021-08-05 ASSESSMENT — HOOS JR
GOING UP OR DOWN STAIRS: MODERATE
LYING IN BED (TURNING OVER, MAINTAINING HIP POSITION): MODERATE
SITTING: MILD
WALKING ON UNEVEN SURFACE: MODERATE
BENDING TO THE FLOOR TO PICK UP OBJECT: SEVERE
RISING FROM SITTING: MODERATE
HOOS JR TOTAL INTERVAL SCORE: 52.97

## 2021-08-05 ASSESSMENT — MIFFLIN-ST. JEOR: SCORE: 1351.89

## 2021-08-05 NOTE — PROGRESS NOTES
Assessment: This is a 70 year old with advanced right hip osteoarthritis associated with severe right hip osteoarthritis associated with severe, ADLs-limiting groin pain. These symptoms are the same that she had on the contra-lateral side prior to her KAREN 10 years ago. Today we discussed the diagnosis and treatment options including living with it, injections, and total hip. We spent twenty minutes discussing total hip arthroplasty.  We discussed the implants, the procedure, the risks and benefits, and the post-operative course.  We discussed blood clots, blood clots to the lungs, injury to blood vessels and nerves, dislocation, infection, and leg length difference. Discussed that I would not expect total hip to improve symptoms associated with those reproduced with examination of the SI joint.  All the patients questions were answered to the best of my ability.    Plan:  Right total hip when the patient chooses to go forward with it.       Chief Complaint: Consult (pt states it's been really bad the alst 4 months, unable to go for long walks/hikes. pt states pain is 'deep' 'catches' )      Physician:  No ref. provider found    HPI: Dorene Ortega is a 70 year old female who presents today for evaluation of her left hip.    Symptom Profile  Location of symptoms: groin   Onset: 2 years  Trend: getting worse over the last 2-3 months   Duration of symptoms: worsening past 2 months  Quality of symptoms: aching, sharp/stabbing when bending over  Severity: severe, impacting daily activities and quality of life and interrupts sleep  Alleviate: rest  Exacerbating: walking, standing extended periods, hiking, bending over  Previous Treatments: Previous treatments include activity modification (hikes with walking poles), oral pain medication    ALEXANDER JR: 52.97    MEDICAL HISTORY: No past medical history on file.  HTN  Aortic stenosis  Medications:     Current Outpatient Medications:      albuterol (PROAIR HFA/PROVENTIL  HFA/VENTOLIN HFA) 108 (90 Base) MCG/ACT inhaler, Inhale 2 puffs into the lungs, Disp: , Rfl:      calcium carbonate 1250 (500 Ca) MG CHEW, Take 2 tablets by mouth, Disp: , Rfl:      Calcium-Vitamin D-Vitamin K (CALCIUM + D) 500-1000-40 MG-UNT-MCG CHEW, CALCIUM + D 500-1000-40 MG-UNT-MCG CHEW, Disp: , Rfl:      cholecalciferol 50 MCG (2000 UT) tablet, Take 2,000 Units by mouth, Disp: , Rfl:      escitalopram (LEXAPRO) 10 MG tablet, Take 10 mg by mouth, Disp: , Rfl:      hydrochlorothiazide (MICROZIDE) 12.5 MG capsule, Take 25 mg by mouth, Disp: , Rfl:      losartan (COZAAR) 100 MG tablet, every 24 hours, Disp: , Rfl:      magnesium 250 MG tablet, Take 250 mg by mouth, Disp: , Rfl:      Multiple Vitamins-Minerals (MULTI FOR HER 50+) TABS, , Disp: , Rfl:      Omega-3 Fatty Acids (FISH OIL) 1200 MG capsule, , Disp: , Rfl:      propylene glycol (SYSTANE BALANCE) 0.6 % SOLN ophthalmic solution, , Disp: , Rfl:      zolpidem (AMBIEN) 10 MG tablet, TAKE 1 TABLET BY MOUTH EVERY DAY AT BEDTIME AS NEEDED FOR SLEEP, Disp: , Rfl:     Allergies: Penicillins, Quinolones, and Sulfa drugs    SURGICAL HISTORY: No past surgical history on file.   TKA   KAREN     FAMILY HISTORY: No family history on file.  No hx of DVT of PE  Anesthesia no issues.     SOCIAL HISTORY:   Social History     Tobacco Use     Smoking status: Former Smoker     Quit date:      Years since quittin.6     Smokeless tobacco: Never Used   Substance Use Topics     Alcohol use: Not on file     Lives in [facility]. Enjoys walking, hiking, playing with grandkids. Non-smoker. 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     REVIEW OF SYSTEMS:  The comprehensive review of systems from the intake form was reviewed with the patient.  No fever, weight change or fatigue. No dry eyes. No oral ulcers, sore throat or voice change. No palpitations, syncope, angina or edema.  No chest pain, excessive sleepiness, shortness of breath or hemoptysis.   No abdominal pain,  "nausea, vomiting, diarrhea or heartburn.  No skin rash. No focal weakness or numbness. No bleeding or lymphadenopathy. No rhinitis or hives.     Exam:  On physical examination the patient appears the stated age, is in no acute distress, affectThe is appropriate, and breathing is non-labored.  Vitals are documented in the EMR and have been reviewed:    Ht 1.651 m (5' 5\")   Wt 82.1 kg (181 lb)   BMI 30.12 kg/m    5' 5\"  Body mass index is 30.12 kg/m .    Rises from chair: easily   Gait: antalgic with less time on the right. Also with a gait that looks like a long-right leg gait. We discussed this.   Trendelenburg test:  Gains the exam table: easily     RIGHT hip subjective: a little irritated   Abd: 20  Add:10  PFC:  Flexion: 85  IRF:0  ERF:25  Impingement test: ++groin  SI joint examination with maneuvers producintg psoterior symptoms at PSIS and buttock    LEFT hip subjective: not irritated  Abd:25+  Add:  PFC:  Flexion: 90+  IRF:10+  ERF:  Impingement test:    Distally, the circulatory, motor, and sensation exam is intact with 5/5 EHL, gastroc-soleus, and tibialis anterior.  Sensation to light touch is intact.  Dorsalis pedis and posterior tibialis pulses are palpable.  There are no sores on the feet, no bruising, and no lymphedema.    X-rays:   Right hip with end stage osteoarthritis medial based with complete loss of medial joint space and large inferior osteophyte on the head and corresponding osteophyte on the acetabulum      "

## 2021-08-05 NOTE — LETTER
8/5/2021         RE: Dorene Ortega  [id number]  [facility] [address]        Dear Colleague,    Thank you for referring your patient, Dorene Ortega, to the [facility] [place]. Please see a copy of my visit note below.    Duplicate      Assessment: This is a 70 year old with advanced right hip osteoarthritis associated with severe right hip osteoarthritis associated with severe, ADLs-limiting groin pain. These symptoms are the same that she had on the contra-lateral side prior to her KAREN 10 years ago. Today we discussed the diagnosis and treatment options including living with it, injections, and total hip. We spent twenty minutes discussing total hip arthroplasty.  We discussed the implants, the procedure, the risks and benefits, and the post-operative course.  We discussed blood clots, blood clots to the lungs, injury to blood vessels and nerves, dislocation, infection, and leg length difference. Discussed that I would not expect total hip to improve symptoms associated with those reproduced with examination of the SI joint.  All the patients questions were answered to the best of my ability.    Plan:  Right total hip when the patient chooses to go forward with it.       Chief Complaint: Consult (pt states it's been really bad the alst 4 months, unable to go for long walks/hikes. pt states pain is 'deep' 'catches' )      Physician:  No ref. provider found    HPI: Dorene Ortega is a 70 year old female who presents today for evaluation of her left hip.    Symptom Profile  Location of symptoms: groin   Onset: 2 years  Trend: getting worse over the last 2-3 months   Duration of symptoms: worsening past 2 months  Quality of symptoms: aching, sharp/stabbing when bending over  Severity: severe, impacting daily activities and quality of life and interrupts sleep  Alleviate: rest  Exacerbating: walking, standing extended periods, hiking, bending over  Previous Treatments: Previous  treatments include activity modification (hikes with walking poles), oral pain medication    ALEXANDER JR: 52.97    MEDICAL HISTORY: No past medical history on file.  HTN  Aortic stenosis  Medications:     Current Outpatient Medications:      albuterol (PROAIR HFA/PROVENTIL HFA/VENTOLIN HFA) 108 (90 Base) MCG/ACT inhaler, Inhale 2 puffs into the lungs, Disp: , Rfl:      calcium carbonate 1250 (500 Ca) MG CHEW, Take 2 tablets by mouth, Disp: , Rfl:      Calcium-Vitamin D-Vitamin K (CALCIUM + D) 500-1000-40 MG-UNT-MCG CHEW, CALCIUM + D 500-1000-40 MG-UNT-MCG CHEW, Disp: , Rfl:      cholecalciferol 50 MCG (2000 UT) tablet, Take 2,000 Units by mouth, Disp: , Rfl:      escitalopram (LEXAPRO) 10 MG tablet, Take 10 mg by mouth, Disp: , Rfl:      hydrochlorothiazide (MICROZIDE) 12.5 MG capsule, Take 25 mg by mouth, Disp: , Rfl:      losartan (COZAAR) 100 MG tablet, every 24 hours, Disp: , Rfl:      magnesium 250 MG tablet, Take 250 mg by mouth, Disp: , Rfl:      Multiple Vitamins-Minerals (MULTI FOR HER 50+) TABS, , Disp: , Rfl:      Omega-3 Fatty Acids (FISH OIL) 1200 MG capsule, , Disp: , Rfl:      propylene glycol (SYSTANE BALANCE) 0.6 % SOLN ophthalmic solution, , Disp: , Rfl:      zolpidem (AMBIEN) 10 MG tablet, TAKE 1 TABLET BY MOUTH EVERY DAY AT BEDTIME AS NEEDED FOR SLEEP, Disp: , Rfl:     Allergies: Penicillins, Quinolones, and Sulfa drugs    SURGICAL HISTORY: No past surgical history on file.   TKA 2010  KAREN 2010    FAMILY HISTORY: No family history on file.  No hx of DVT of PE  Anesthesia no issues.     SOCIAL HISTORY:   Social History     Tobacco Use     Smoking status: Former Smoker     Quit date:      Years since quittin.6     Smokeless tobacco: Never Used   Substance Use Topics     Alcohol use: Not on file     Lives in [facility]. Enjoys walking, hiking, playing with grandkids. Non-smoker. 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     REVIEW OF SYSTEMS:  The comprehensive review of systems from the intake  "form was reviewed with the patient.  No fever, weight change or fatigue. No dry eyes. No oral ulcers, sore throat or voice change. No palpitations, syncope, angina or edema.  No chest pain, excessive sleepiness, shortness of breath or hemoptysis.   No abdominal pain, nausea, vomiting, diarrhea or heartburn.  No skin rash. No focal weakness or numbness. No bleeding or lymphadenopathy. No rhinitis or hives.     Exam:  On physical examination the patient appears the stated age, is in no acute distress, affectThe is appropriate, and breathing is non-labored.  Vitals are documented in the EMR and have been reviewed:    Ht 1.651 m (5' 5\")   Wt 82.1 kg (181 lb)   BMI 30.12 kg/m    5' 5\"  Body mass index is 30.12 kg/m .    Rises from chair: easily   Gait: antalgic with less time on the right. Also with a gait that looks like a long-right leg gait. We discussed this.   Trendelenburg test:  Gains the exam table: easily     RIGHT hip subjective: a little irritated   Abd: 20  Add:10  PFC:  Flexion: 85  IRF:0  ERF:25  Impingement test: ++groin  SI joint examination with maneuvers producintg psoterior symptoms at PSIS and buttock    LEFT hip subjective: not irritated  Abd:25+  Add:  PFC:  Flexion: 90+  IRF:10+  ERF:  Impingement test:    Distally, the circulatory, motor, and sensation exam is intact with 5/5 EHL, gastroc-soleus, and tibialis anterior.  Sensation to light touch is intact.  Dorsalis pedis and posterior tibialis pulses are palpable.  There are no sores on the feet, no bruising, and no lymphedema.    X-rays:   Right hip with end stage osteoarthritis medial based with complete loss of medial joint space and large inferior osteophyte on the head and corresponding osteophyte on the acetabulum          Again, thank you for allowing me to participate in the care of your patient.        Sincerely,        Dominic Weiss MD    "

## 2021-08-05 NOTE — LETTER
Date:August 21, 2021      Patient was self referred, no letter generated. Do not send.        [facility] Health Information

## 2021-08-05 NOTE — NURSING NOTE
Teaching Flowsheet   Relevant Diagnosis: Right hip osteoarthritis  Teaching Topic: Right total hip arthroplasty.    Patient is accompanied by her son Ian Ortega today who will act as her /support person. Patient is from [facility] but will be staying with her son after surgery in his one story ranch home. Health history is positive for HTN on Losartan and hydrochlorothiazide (advised patient to hold both meds the morning of surgery), asthma during cold season (she will bring her inhaler with on day of surgery), hx of right knee and left hip replacements. Past history of C.diff. Sister is a hemophilia carrier; however, patient was tested and found to be negative.      Person(s) involved in teaching:   Patient and Son     Motivation Level:  Asks Questions: Yes  Eager to Learn: Yes  Cooperative: Yes  Receptive (willing/able to accept information): Yes  Any cultural factors/Congregation beliefs that may influence understanding or compliance? No     Patient and Family demonstrates understanding of the following:  Reason for the appointment, diagnosis and treatment plan: Yes  Knowledge of proper use of medications and conditions for which they are ordered (with special attention to potential side effects or drug interactions): Yes  Which situations necessitate calling provider and whom to contact: Yes     Teaching Concerns Addressed: Discussed total joint online class. Patient understands he will need a preop exam within 30 days of date of surgery. He understands the expected length of stay and the expectation of outpatient physical therapy. Discussed dental prophylaxis as well as the need for COVID testing 4 days prior to surgery. Discussed the GetWell Loop.     Proper use and care of assistive devices (walker) (medical equip, care aids, etc.): Yes  Nutritional needs and diet plan: Yes  Pain management techniques: Yes  Wound Care: Yes  How and/when to access community resources: Yes     Instructional Materials  Used/Given: Preoperative teaching packet, surgical soap x2, dental card, total joint class booklet.

## 2021-08-05 NOTE — NURSING NOTE
"Reason For Visit:   Chief Complaint   Patient presents with     Consult     pt states it's been really bad the alst 4 months, unable to go for long walks/hikes. pt states pain is 'deep' 'catches'        Ht 1.651 m (5' 5\")   Wt 82.1 kg (181 lb)   BMI 30.12 kg/m      Pain Assessment  Patient Currently in Pain: Yes  0-10 Pain Scale: 4  Primary Pain Location: Hip    Sharlene Ramirez ATC       "

## 2021-08-06 ENCOUNTER — TELEPHONE (OUTPATIENT)
Dept: ORTHOPEDICS | Facility: CLINIC | Age: 71
End: 2021-08-06

## 2021-08-06 PROBLEM — M25.551 RIGHT HIP PAIN: Status: ACTIVE | Noted: 2021-08-06

## 2021-08-06 NOTE — TELEPHONE ENCOUNTER
Returned patient's voicemail. There was no answer, left voicemail offered 10/27/21 as potential surgery date. Gave [phone] as call back number.

## 2021-08-06 NOTE — TELEPHONE ENCOUNTER
Patient is scheduled for surgery with Dr. Weiss    Spoke with: Fatimah    Date of Surgery: 10/27/2021    Location: [facility]    Informed patient they will need an adult  yes    Pre op with Provider n/a    H&P: Scheduled with pac on 10/20/2021    Pre-procedure COVID-19 Test: patient will schedule with covid scheduling team    Additional imaging/appointments: n/a    Surgery packet: given in clinic     Additional comments: n/a

## 2021-08-09 NOTE — TELEPHONE ENCOUNTER
FUTURE VISIT INFORMATION      SURGERY INFORMATION:    Date: 10/27/21    Location: [facility]    Surgeon:  Dominic Weiss MD    Anesthesia Type:  choice    Procedure: RIGHT TOTAL HIP ARTHROPLASTY    Consult: ov     RECORDS REQUESTED FROM:       Primary Care Provider: Lee Crum MD  - [facility]    Pertinent Medical History:    Most recent EKG+ Tracin19-[facility] and [place]    Most recent ECHO: 9/15/20-[facility]

## 2021-08-24 DIAGNOSIS — M25.551 RIGHT HIP PAIN: Primary | ICD-10-CM

## 2021-09-01 DIAGNOSIS — Z11.59 ENCOUNTER FOR SCREENING FOR OTHER VIRAL DISEASES: ICD-10-CM

## 2021-09-01 NOTE — TELEPHONE ENCOUNTER
Called and left voicemail for patient about moving surgery with Dr. Weiss soonismael. Offered 9/22 and 9/29 as potential surgery dates. Gave [phone] as call back number.

## 2021-09-01 NOTE — TELEPHONE ENCOUNTER
Patient is scheduled for surgery with Dr. Weiss    Spoke with: Fatimah    Date of Surgery: 9/29/2021    Location: [facility]    Informed patient they will need an adult  yes    Pre op with Provider n/a    H&P: Scheduled with PAC on 9/27/21    Pre-procedure COVID-19 Test: [facility] on 9/27/21    Additional imaging/appointments: n/a    Surgery packet: n/a     Additional comments: n/a

## 2021-09-01 NOTE — TELEPHONE ENCOUNTER
FUTURE VISIT INFORMATION      SURGERY INFORMATION:    2021 // dr. small    Consult: OV     RECORDS REQUESTED FROM:       Primary Care Provider: Ashlie García DNP, SHITALP     Most recent EKG+ Tracin19- [facility]    Most recent ECHO: 9/15/20- [facility]    Most recent Cardiac Stress Test: 18- [facility]

## 2021-09-10 ENCOUNTER — TELEPHONE (OUTPATIENT)
Dept: ORTHOPEDICS | Facility: CLINIC | Age: 71
End: 2021-09-10

## 2021-09-10 NOTE — TELEPHONE ENCOUNTER
Returned call to patient. Advised that she could do the filling on 9/15. She will take her predental antibiotics. She agrees to let us know if the procedure ends up being more than anticipated. Understanding expressed.

## 2021-09-10 NOTE — TELEPHONE ENCOUNTER
[facility] Call Center    Phone Message    May a detailed message be left on voicemail: yes     Reason for Call Patient called asking for call back . She is have a cavity filled on Sept 15 and She wants to know is that to soon to have before Her Surgery on Sept 29. Please call Patient . She need to know ASAP   Action Taken: Message routed to:  [facility] P    Travel Screening: Not Applicable

## 2021-09-20 ENCOUNTER — TELEPHONE (OUTPATIENT)
Dept: ORTHOPEDICS | Facility: CLINIC | Age: 71
End: 2021-09-20

## 2021-09-20 NOTE — TELEPHONE ENCOUNTER
[facility] Call [facility]    Phone Message    May a detailed message be left on voicemail: yes     Reason for Call: Other: Patient would like to know if she can get her covid buster prior to her surgery.     Pt's surgery is scheduled for 9/29 with Dr. Randhawa    Action Taken: Message routed to:  [facility] ortho    Travel Screening: Not Applicable

## 2021-09-21 NOTE — TELEPHONE ENCOUNTER
Attempted to reach out to patient to answer her question regarding getting a COVID booster before her surgery on 9/29. Left message with callback information.    It is not advised to get this so close to her scheduled surgery due to the risk of side effects from the shot (fever, aches, etc.).

## 2021-09-23 ENCOUNTER — TELEPHONE (OUTPATIENT)
Dept: ORTHOPEDICS | Facility: CLINIC | Age: 71
End: 2021-09-23

## 2021-09-23 NOTE — TELEPHONE ENCOUNTER
[facility] Call Center    Phone Message    May a detailed message be left on voicemail: yes     Reason for Call: Other: Pt woud like a callback to discuss:    Pt is scheduled for surgery on 9/29 with Dr. Weiss  But  1. Dermatitis on 9/21 with a topical and oral antibiotics treatment, pt's last dose for antibiotics is 9/27, 1000mg of cephalexin daily.  2. The dermatitis is healing but is the use of antibiotics going to effect surgery on 9/21.    Action Taken: Message routed to:  [facility] ortho    Travel Screening: Not Applicable

## 2021-09-27 ENCOUNTER — OFFICE VISIT (OUTPATIENT)
Dept: SURGERY | Facility: CLINIC | Age: 71
End: 2021-09-27
Payer: MEDICARE

## 2021-09-27 ENCOUNTER — LAB (OUTPATIENT)
Dept: LAB | Facility: CLINIC | Age: 71
End: 2021-09-27
Payer: MEDICARE

## 2021-09-27 ENCOUNTER — ANESTHESIA EVENT (OUTPATIENT)
Dept: SURGERY | Facility: CLINIC | Age: 71
End: 2021-09-27
Payer: MEDICARE

## 2021-09-27 ENCOUNTER — PRE VISIT (OUTPATIENT)
Dept: SURGERY | Facility: CLINIC | Age: 71
End: 2021-09-27

## 2021-09-27 ENCOUNTER — LAB (OUTPATIENT)
Dept: LAB | Facility: CLINIC | Age: 71
End: 2021-09-27

## 2021-09-27 VITALS
RESPIRATION RATE: 12 BRPM | WEIGHT: 183 LBS | HEART RATE: 78 BPM | SYSTOLIC BLOOD PRESSURE: 107 MMHG | HEIGHT: 65 IN | DIASTOLIC BLOOD PRESSURE: 76 MMHG | TEMPERATURE: 97.7 F | OXYGEN SATURATION: 97 % | BODY MASS INDEX: 30.49 KG/M2

## 2021-09-27 DIAGNOSIS — I35.0 AORTIC VALVE STENOSIS, ETIOLOGY OF CARDIAC VALVE DISEASE UNSPECIFIED: Primary | ICD-10-CM

## 2021-09-27 DIAGNOSIS — Z11.59 ENCOUNTER FOR SCREENING FOR OTHER VIRAL DISEASES: ICD-10-CM

## 2021-09-27 DIAGNOSIS — M25.551 RIGHT HIP PAIN: ICD-10-CM

## 2021-09-27 DIAGNOSIS — Z01.818 PRE-OP EXAMINATION: Primary | ICD-10-CM

## 2021-09-27 DIAGNOSIS — Z01.818 PRE-OP EXAMINATION: ICD-10-CM

## 2021-09-27 LAB
ABO/RH(D): NORMAL
ANION GAP SERPL CALCULATED.3IONS-SCNC: 4 MMOL/L (ref 3–14)
ANTIBODY SCREEN: NEGATIVE
BUN SERPL-MCNC: 20 MG/DL (ref 7–30)
CALCIUM SERPL-MCNC: 9.4 MG/DL (ref 8.5–10.1)
CHLORIDE BLD-SCNC: 99 MMOL/L (ref 94–109)
CO2 SERPL-SCNC: 30 MMOL/L (ref 20–32)
CREAT SERPL-MCNC: 0.85 MG/DL (ref 0.52–1.04)
ERYTHROCYTE [DISTWIDTH] IN BLOOD BY AUTOMATED COUNT: 12.2 % (ref 10–15)
GFR SERPL CREATININE-BSD FRML MDRD: 70 ML/MIN/1.73M2
GLUCOSE BLD-MCNC: 99 MG/DL (ref 70–99)
HCT VFR BLD AUTO: 37.5 % (ref 35–47)
HGB BLD-MCNC: 13.3 G/DL (ref 11.7–15.7)
MCH RBC QN AUTO: 31.5 PG (ref 26.5–33)
MCHC RBC AUTO-ENTMCNC: 35.5 G/DL (ref 31.5–36.5)
MCV RBC AUTO: 89 FL (ref 78–100)
PLATELET # BLD AUTO: 327 10E3/UL (ref 150–450)
POTASSIUM BLD-SCNC: 3.7 MMOL/L (ref 3.4–5.3)
RBC # BLD AUTO: 4.22 10E6/UL (ref 3.8–5.2)
SODIUM SERPL-SCNC: 133 MMOL/L (ref 133–144)
SPECIMEN EXPIRATION DATE: NORMAL
WBC # BLD AUTO: 5.9 10E3/UL (ref 4–11)

## 2021-09-27 PROCEDURE — U0005 INFEC AGEN DETEC AMPLI PROBE: HCPCS | Performed by: ORTHOPAEDIC SURGERY

## 2021-09-27 PROCEDURE — 80048 BASIC METABOLIC PNL TOTAL CA: CPT | Performed by: PATHOLOGY

## 2021-09-27 PROCEDURE — 93000 ELECTROCARDIOGRAM COMPLETE: CPT | Performed by: INTERNAL MEDICINE

## 2021-09-27 PROCEDURE — 36415 COLL VENOUS BLD VENIPUNCTURE: CPT | Performed by: PATHOLOGY

## 2021-09-27 PROCEDURE — 99204 OFFICE O/P NEW MOD 45 MIN: CPT | Performed by: PHYSICIAN ASSISTANT

## 2021-09-27 PROCEDURE — 86900 BLOOD TYPING SEROLOGIC ABO: CPT | Performed by: PHYSICIAN ASSISTANT

## 2021-09-27 PROCEDURE — 85027 COMPLETE CBC AUTOMATED: CPT | Performed by: PATHOLOGY

## 2021-09-27 ASSESSMENT — LIFESTYLE VARIABLES: TOBACCO_USE: 1

## 2021-09-27 ASSESSMENT — MIFFLIN-ST. JEOR: SCORE: 1355.96

## 2021-09-27 ASSESSMENT — PAIN SCALES - GENERAL: PAINLEVEL: MILD PAIN (2)

## 2021-09-27 ASSESSMENT — ENCOUNTER SYMPTOMS: SEIZURES: 0

## 2021-09-27 NOTE — ANESTHESIA PREPROCEDURE EVALUATION
Anesthesia Pre-Procedure Evaluation    Patient: Dorene Ortega   MRN: [id number] : 1950        Preoperative Diagnosis: Right hip pain [M25.551]   Procedure : Procedure(s):  RIGHT TOTAL HIP ARTHROPLASTY     Past Medical History:   Diagnosis Date     Asthma      BMI 30.0-30.9,adult      History of Clostridioides difficile infection      HTN (hypertension)      Insomnia      Migraine with aura and without status migrainosus, not intractable      Mild aortic stenosis      Osteoarthritis      Osteopenia       Past Surgical History:   Procedure Laterality Date     ARTHROSCOPY KNEE       CATARACT EXTRACTION       COLONOSCOPY       HIP ABDUCTOR TEAR REPAIR       LUMBAR LAMINECTOMY       MAMMOPLASTY REDUCTION       NISSEN FUNDOPLICATION       ORIF Left thumb proximal phalanx        TOTAL HIP ARTHROPLASTY Left      TOTAL KNEE ARTHROPLASTY Right       Allergies   Allergen Reactions     Penicillins Hives, Rash and Unknown     Hives as an adult        Quinolones Hives, Itching and Rash     Other reaction(s): Pruritus, Rash//Quinalones  Cipro  Cipro       Sulfa Drugs Hives      Social History     Tobacco Use     Smoking status: Former Smoker     Quit date:      Years since quittin.7     Smokeless tobacco: Never Used   Substance Use Topics     Alcohol use: Not on file      Wt Readings from Last 1 Encounters:   21 82.1 kg (181 lb)        Anesthesia Evaluation   Pt has had prior anesthetic. Type: General.    No history of anesthetic complications       ROS/MED HX  ENT/Pulmonary:     (+) tobacco use (34.5 pack years and quit in ), Past use, 35  Pack-Year Hx,  Intermittent, asthma (only uses when she gets a cold) Treatment: Inhaler prn,      Neurologic:     (+) migraines (with aura. Resolved with cessation for caffeine ),  (-) no seizures, no CVA and no TIA   Cardiovascular:     (+) Dyslipidemia hypertension-----valvular problems/murmurs type: AS mild. Previous cardiac testing   Echo: Date: 9/15/20 Results:     Normal chamber sizes     Normal left ventricular systolic function with apparent basal inferior   and inferoseptal hypokinesis, estimated ejection fraction of 65%     Normal right ventricular systolic function     Mild aortic stenosis     Compared with the previous echocardiogram from 4/24/2019, the   endocardial border definition is not as good on the previous study, but no   regional wall motion abnormalities are apparent    Stress Test: Date: 2018 Results:  Impression:     1. Normal myocardial perfusion scans after a regadenoson injection and at   rest.     2. Normal poststress left ventricle systolic function.     3. Cardiac Risk Assessment: Low     ECG Reviewed: Date: 9/27/21 Results:  Normal sinus rhythm.   Cath:  Date: Results:      METS/Exercise Tolerance: 4 - Raking leaves, gardening    Hematologic:  - neg hematologic  ROS     Musculoskeletal: Comment: S/p right TKA, s/p left KAREN, osteopenia   (+) arthritis,     GI/Hepatic:     (+) hiatal hernia (s/p Nissen),  (-) GERD   Renal/Genitourinary:  - neg Renal ROS     Endo:     (+) Obesity (BMI 30),     Psychiatric/Substance Use:     (+) psychiatric history other (comment) (insomnia )     Infectious Disease: Comment: History of C diff around 2010      Malignancy:  - neg malignancy ROS     Other:  - neg other ROS          Physical Exam    Airway        Mallampati: II   TM distance: > 3 FB   Neck ROM: full   Mouth opening: > 3 cm    Respiratory Devices and Support         Dental       (+) partials      Cardiovascular          Rhythm and rate: regular and normal   (+) murmur       Pulmonary   pulmonary exam normal                OUTSIDE LABS:  CBC: No results found for: WBC, HGB, HCT, PLT  BMP: No results found for: NA, POTASSIUM, CHLORIDE, CO2, BUN, CR, GLC  COAGS: No results found for: PTT, INR, FIBR  POC: No results found for: BGM, HCG, HCGS  HEPATIC: No results found for: ALBUMIN, PROTTOTAL, ALT, AST, GGT, ALKPHOS, BILITOTAL, BILIDIRECT, DOMINIC  OTHER: No results  found for: PH, LACT, A1C, RICARDO, PHOS, MAG, LIPASE, AMYLASE, TSH, T4, T3, CRP, SED    Anesthesia Plan    ASA Status:  3      Anesthesia Type: General.     - Airway: ETT   Induction: Intravenous.   Maintenance: Inhalation.   Techniques and Equipment:     - Airway: Video-Laryngoscope     - Lines/Monitors: 2nd IV, BIS (Clearsight)     - Blood: T&S     Consents         - Patient is DNR/DNI Status: No         Postoperative Care    Pain management: Multi-modal analgesia, Oral pain medications.   PONV prophylaxis: Ondansetron (or other 5HT-3), Dexamethasone or Solumedrol     Comments:              PAC Discussion and Assessment    ASA Classification: 3  Case is suitable for: [facility] [place]  PAC Recommendations anesthetic techniques: choice.                  PAC Resident/NP Anesthesia Assessment: Fatimah is a 71 year old woman who is scheduled for RIGHT TOTAL HIP ARTHROPLASTY on 9/29/21 by Dr. Weiss in treatment of Right hip pain.  PAC referral for risk assessment and optimization for anesthesia with comorbid conditions of mild aortic stenosis, hypertension, former smoker, asthma, migraines, obesity, history of C. difficile, insomnia, osteopenia, osteoarthritis status post right total knee arthroplasty and left total hip arthroplasty:      Pre-operative considerations:   1.  Cardiac:  Functional status- METS 4, the patient goes hiking and uses hiking poles due to pain. She was biking this summer and taking care of her elderly parents. She denies any cardiac symptoms. .  Intermediate risk surgery with 0.4% (RCRI #) risk of major adverse cardiac event.    ~ Mild aortic stenosis - followed by cardiology and last seen on 10/26/20 with plans for 1 year follow up. Echo on 9/15/20 shows mild aortic stenosis. Tried to get an updated echo but non were available prior to the patient's procedure and she has no new symptoms with good METS. No further testing indicated.   ~ HTN - hold cozaar and HCTZ day of surgery. Will get updated EKG today.      2.  Pulm:  Airway feasible.  AILYN risk: Low (age, HTN)  ~ Former smoker - 34.5 pack year history.   ~ Asthma - in winter months with colds - PRN albuterol.     3. Neuro:  Migraines with aura - resolved with cessation of caffeine a month ago.     4. Endo: BMI 30 - consideration for safe lifting techniques.     5. GI:  Risk of PONV score = 3.  If > 2, anti-emetic intervention recommended.     6. ID: History of C diff around 2010 - no issues since then.     7. Psych: insomnia - continue Lexapro and ambien.     8. Musculoskeletal:  osteoarthritis - s/p right TKA, s/p left KAREN and osteopenia - consideration for careful positioning. Procedure as above.     9. Skin: Dermatitis - the patient is finishing a course of cephalexin today for dermatitis on her shins and arms. No signs of ongoing infection today.     VTE risk: 1.8% (age, sister with history of DVT)     Patient is optimized and is acceptable candidate for the proposed procedure.  No further diagnostic evaluation is needed.      Patient discussed with Dr. Hobbs.     For further details of assessment, testing, and physical exam please see H and P completed on same date     Akila Beckham PA-C       Mid-Level Provider/Resident: Akila Beckham PA-C  Date: 9/27/21                                 Akila Beckham PA-C

## 2021-09-27 NOTE — H&P
Pre-Operative H & P     CC:  Preoperative exam to assess for increased cardiopulmonary risk while undergoing surgery and anesthesia.    Date of Encounter: 9/27/2021  Primary Care Physician:  Jasmine Robbins     Reason for visit:   Encounter Diagnoses   Name Primary?     Pre-op examination Yes     Right hip pain        HPI  Dorene Ortega is a 71 year old female who presents for pre-operative H & P in preparation for RIGHT TOTAL HIP ARTHROPLASTY with Dr. Weiss on 9/29/21 at [facility] [place].     The patient is a 71-year-old woman with a past medical history significant for mild aortic stenosis, hypertension, former smoker, asthma, migraines, obesity, history of C. difficile, insomnia, osteopenia, osteoarthritis status post right total knee arthroplasty and left total hip arthroplasty.  She met with Dr. Weiss on 8/5/2021 as her right hip is causing her severe pain.  This is limiting her ability to do her normal activities such as long walks and hikes and she wished to discuss her surgical treatment options as she wants to be able to be more activie with her grandchildren. The patient has been counseled for the procedure as above.    History is obtained from the patient and chart review      Hx of abnormal bleeding or anti-platelet use: none    Menstrual history: No LMP recorded. Patient is postmenopausal.:     Prior to Admission Medications  Current Outpatient Medications   Medication Sig Dispense Refill     albuterol (PROAIR HFA/PROVENTIL HFA/VENTOLIN HFA) 108 (90 Base) MCG/ACT inhaler Inhale 2 puffs into the lungs every 6 hours as needed        escitalopram (LEXAPRO) 10 MG tablet Take 10 mg by mouth every morning        hydrochlorothiazide (MICROZIDE) 12.5 MG capsule Take 25 mg by mouth every morning        losartan (COZAAR) 100 MG tablet Take  mg by mouth 2 times daily        zolpidem (AMBIEN) 10 MG tablet Take 10 mg by mouth nightly as needed        calcium carbonate 1250  (500 Ca) MG CHEW Take 2 tablets by mouth (Patient not taking: Reported on 2021)       Calcium-Vitamin D-Vitamin K (CALCIUM + D) 500-1000-40 MG-UNT-MCG CHEW CALCIUM + D 500-1000-40 MG-UNT-MCG CHEW (Patient not taking: Reported on 2021)       cholecalciferol 50 MCG (2000 UT) tablet Take 2,000 Units by mouth (Patient not taking: Reported on 2021)       magnesium 250 MG tablet Take 250 mg by mouth (Patient not taking: Reported on 2021)       Multiple Vitamins-Minerals (MULTI FOR HER 50+) TABS  (Patient not taking: Reported on 2021)       Omega-3 Fatty Acids (FISH OIL) 1200 MG capsule  (Patient not taking: Reported on 2021)       propylene glycol (SYSTANE BALANCE) 0.6 % SOLN ophthalmic solution  (Patient not taking: Reported on 2021)         Family History  Family History   Problem Relation Age of Onset     Hemophilia Sister         carrier     Deep Vein Thrombosis (DVT) Sister      Anesthesia Reaction No family hx of        The complete review of systems is negative other than noted in the HPI or here.     Preprocedure Note     Last edited 21 1302 by Akila Beckham PA-C  Date of Service 21 1220  Creation Time 21 1220  Status: Addendum             Anesthesia Pre-Procedure Evaluation    Patient: Dorene Ortega   MRN: [id number] : 1950        Preoperative Diagnosis: Right hip pain [M25.551]   Procedure : Procedure(s):  RIGHT TOTAL HIP ARTHROPLASTY     Past Medical History:   Diagnosis Date     Asthma      BMI 30.0-30.9,adult      History of Clostridioides difficile infection      HTN (hypertension)      Insomnia      Migraine with aura and without status migrainosus, not intractable      Mild aortic stenosis      Osteoarthritis      Osteopenia       Past Surgical History:   Procedure Laterality Date     ARTHROSCOPY KNEE       CATARACT EXTRACTION       COLONOSCOPY       HIP ABDUCTOR TEAR REPAIR       LUMBAR LAMINECTOMY       MAMMOPLASTY REDUCTION        NISSEN FUNDOPLICATION       ORIF Left thumb proximal phalanx        TOTAL HIP ARTHROPLASTY Left      TOTAL KNEE ARTHROPLASTY Right       Allergies   Allergen Reactions     Penicillins Hives, Rash and Unknown     Hives as an adult        Quinolones Hives, Itching and Rash     Other reaction(s): Pruritus, Rash//Quinalones  Cipro  Cipro       Sulfa Drugs Hives      Social History     Tobacco Use     Smoking status: Former Smoker     Quit date:      Years since quittin.7     Smokeless tobacco: Never Used   Substance Use Topics     Alcohol use: Not on file      Wt Readings from Last 1 Encounters:   21 82.1 kg (181 lb)        Anesthesia Evaluation   Pt has had prior anesthetic. Type: General.    No history of anesthetic complications       ROS/MED HX  ENT/Pulmonary:     (+) tobacco use (34.5 pack years and quit in ), Past use, Intermittent, asthma (only uses when she gets a cold) Treatment: Inhaler prn,      Neurologic:     (+) migraines (with aura. Resolved with cessation for caffeine ),  (-) no seizures, no CVA and no TIA   Cardiovascular:     (+) hypertension-----valvular problems/murmurs type: AS mild. Previous cardiac testing   Echo: Date: 9/15/20 Results:    Normal chamber sizes     Normal left ventricular systolic function with apparent basal inferior   and inferoseptal hypokinesis, estimated ejection fraction of 65%     Normal right ventricular systolic function     Mild aortic stenosis     Compared with the previous echocardiogram from 2019, the   endocardial border definition is not as good on the previous study, but no   regional wall motion abnormalities are apparent    Stress Test: Date:  Results:  Impression:     1. Normal myocardial perfusion scans after a regadenoson injection and at   rest.     2. Normal poststress left ventricle systolic function.     3. Cardiac Risk Assessment: Low     ECG Reviewed: Date: 21 Results:  Normal sinus rhythm.   Cath:  Date: Results:     "  METS/Exercise Tolerance: 4 - Raking leaves, gardening    Hematologic:  - neg hematologic  ROS     Musculoskeletal: Comment: S/p right TKA, s/p left KAREN, osteopenia   (+) arthritis,     GI/Hepatic:     (+) hiatal hernia (s/p Nissen),  (-) GERD   Renal/Genitourinary:  - neg Renal ROS     Endo:     (+) Obesity,     Psychiatric/Substance Use:     (+) psychiatric history other (comment) (insomnia )     Infectious Disease: Comment: History of C diff around 2010      Malignancy:  - neg malignancy ROS     Other:  - neg other ROS            /76 (BP Location: Right arm, Patient Position: Chair, Cuff Size: Adult Large)   Pulse 78   Temp 97.7  F (36.5  C) (Oral)   Resp 12   Ht 1.651 m (5' 5\")   Wt 83 kg (183 lb)   SpO2 97%   Breastfeeding No   BMI 30.45 kg/m           Physical Exam  Constitutional: Awake, alert, cooperative, no apparent distress, and appears stated age.  Eyes: Pupils equal, round and reactive to light, extra ocular muscles intact, sclera clear, conjunctiva normal.  HENT: Normocephalic, oral pharynx with moist mucus membranes, good dentition - left back partials. No goiter appreciated.   Respiratory: Clear to auscultation bilaterally, no crackles or wheezing.  Cardiovascular: Regular rate and rhythm, normal S1 and S2, and systolic murmur at RSB noted.  Carotids +2, no bruits. No edema. Palpable pulses to radial  DP and PT arteries.   GI: Normal bowel sounds, soft, non-distended, non-tender, no masses palpated, no hepatosplenomegaly.    Lymph/Hematologic: No cervical lymphadenopathy and no supraclavicular lymphadenopathy.  Genitourinary:  defer  Skin: Warm and dry.  No rashes at anticipated surgical site.   Musculoskeletal: Full ROM of neck. There is no redness, warmth, or swelling of the joints. Gross motor strength is normal.    Neurologic: Awake, alert, oriented to name, place and time. Cranial nerves II-XII are grossly intact. Gait is normal.   Neuropsychiatric: Calm, cooperative. Normal " affect.     PRIOR LABS/DIAGNOSTIC STUDIES:   All labs and imaging personally reviewed     EKG/ stress test - if available please see in ROS above       The patient's records and results personally reviewed by this provider.     Outside records reviewed from: care everywhere    LAB/DIAGNOSTIC STUDIES TODAY:    Results for ZAK LEONARD (MRN [id number]) as of 9/27/2021 14:32   Ref. Range 9/27/2021 13:42   Sodium Latest Ref Range: 133 - 144 mmol/L 133   Potassium Latest Ref Range: 3.4 - 5.3 mmol/L 3.7   Chloride Latest Ref Range: 94 - 109 mmol/L 99   Carbon Dioxide Latest Ref Range: 20 - 32 mmol/L 30   Urea Nitrogen Latest Ref Range: 7 - 30 mg/dL 20   Creatinine Latest Ref Range: 0.52 - 1.04 mg/dL 0.85   GFR Estimate Latest Ref Range: >60 mL/min/1.73m2 70   Calcium Latest Ref Range: 8.5 - 10.1 mg/dL 9.4   Anion Gap Latest Ref Range: 3 - 14 mmol/L 4   Glucose Latest Ref Range: 70 - 99 mg/dL 99   WBC Latest Ref Range: 4.0 - 11.0 10e3/uL 5.9   Hemoglobin Latest Ref Range: 11.7 - 15.7 g/dL 13.3   Hematocrit Latest Ref Range: 35.0 - 47.0 % 37.5   Platelet Count Latest Ref Range: 150 - 450 10e3/uL 327   RBC Count Latest Ref Range: 3.80 - 5.20 10e6/uL 4.22   MCV Latest Ref Range: 78 - 100 fL 89   MCH Latest Ref Range: 26.5 - 33.0 pg 31.5   MCHC Latest Ref Range: 31.5 - 36.5 g/dL 35.5   RDW Latest Ref Range: 10.0 - 15.0 % 12.2           ASSESSMENT and PLAN  Zak is a 71 year old woman who is scheduled for RIGHT TOTAL HIP ARTHROPLASTY on 9/29/21 by Dr. Weiss in treatment of Right hip pain.  PAC referral for risk assessment and optimization for anesthesia with comorbid conditions of mild aortic stenosis, hypertension, former smoker, asthma, migraines, obesity, history of C. difficile, insomnia, osteopenia, osteoarthritis status post right total knee arthroplasty and left total hip arthroplasty:      Pre-operative considerations:   1.  Cardiac:  Functional status- METS 4, the patient goes hiking and uses hiking poles due to  pain. She was biking this summer and taking care of her elderly parents. She denies any cardiac symptoms. .  Intermediate risk surgery with 0.4% (RCRI #) risk of major adverse cardiac event.    ~ Mild aortic stenosis - followed by cardiology and last seen on 10/26/20 with plans for 1 year follow up. Echo on 9/15/20 shows mild aortic stenosis. Tried to get an updated echo but non were available prior to the patient's procedure and she has no new symptoms with good METS. No further testing indicated.   ~ HTN - hold cozaar and HCTZ day of surgery. Will get updated EKG today.     2.  Pulm:  Airway feasible.  AILYN risk: Low (age, HTN)  ~ Former smoker - 34.5 pack year history.   ~ Asthma - in winter months with colds - PRN albuterol.     3. Neuro:  Migraines with aura - resolved with cessation of caffeine a month ago.     4. Endo: BMI 30 - consideration for safe lifting techniques.     5. GI:  Risk of PONV score = 3.  If > 2, anti-emetic intervention recommended.     6. ID: History of C diff around 2010 - no issues since then.     7. Psych: insomnia - continue Lexapro and ambien.     8. Musculoskeletal:  osteoarthritis - s/p right TKA, s/p left KAREN and osteopenia - consideration for careful positioning. Procedure as above.     9. Skin: Dermatitis - the patient is finishing a course of cephalexin today for dermatitis on her shins and arms. No signs of ongoing infection today.     VTE risk: 1.8% (age, sister with history of DVT)        Patient was discussed with Dr Hobbs.       The patient is optimized and acceptable candidate for proposed procedure. Arrival time, NPO, shower and medication instructions provided by nursing staff today.      On the day of service:     Prep time: 12 minutes  Visit time: 25 minutes  Documentation time: 10 minutes  ------------------------------------------  Total time: 47 minutes      Akila Beckham PA-C  Preoperative Assessment Center  [facility]  Clinic and Surgery  [place]  Phone: [phone]  Fax: [phone]

## 2021-09-27 NOTE — PATIENT INSTRUCTIONS
Preparing for Your Surgery      Name:  Dorene Ortega   MRN:  [id number]   :  1950   Today's Date:  2021       Arriving for surgery:  Surgery date:  21  Arrival time:  8AM    Restrictions due to COVID 19:       One visitor is allowed in the Pre Op area. When you go into surgery, one visitor is allowed to wait in the Surgery Waiting Room       (provided there is enough space to social distance).   After surgery- Two visitors are allowed at a time if you have a private room and one visitor is allowed for those in a semi-private room.   Every 4 days the visitor(s) can rotate. During the 4 day period, the visitor(s) must be consistent. No visitors under the age of 18 years old.   Visiting Hours: 8 am - 8:30 pm   No ill visitors.   All visitors must wear face mask.    [employer] parking is available for anyone with mobility limitations or disabilities.  ([facility]  24 hours/ 7 days a week; [facility]  7 am- 3:30 pm, Mon- Fri)    Please come to:     [facility] [address]  [address] [place], [place] [address]. [place]  [address]    -[employer] parking is available in front of [facility] from 7:00AM to 3:30PM. If you prefer, park your car in the Green Lot.    -Proceed to the 3rd floor, check in at the Adult Surgery Waiting Lounge. [phone]    If an escort is needed stop at the Information Desk in the lobby. Inform the information person that you are here for surgery. An escort to the Adult Surgery Waiting Lounge will be provided.        What can I eat or drink?  -  You may eat and drink normally for up to 8 hours before your surgery. (Until 21, 2:30AM)  -  You may have clear liquids until 2 hours before surgery. (Until 21, 8AM)    Examples of clear liquids:  Water  Clear broth  Juices (apple, white grape, white cranberry  and cider) without pulp  Noncarbonated, powder based beverages  (lemonade and Mahendra-Aid)  Sodas (Sprite, 7-Up, ginger ale and  alisa)  Coffee or tea (without milk or cream)  Gatorade    -  No Alcohol for at least 24 hours before surgery     Which medicines can I take?    Hold Aspirin for 7 days before surgery.   Hold Multivitamins for 7 days before surgery.  Hold Supplements for 7 days before surgery.  Hold Ibuprofen (Advil, Motrin) for 1 day before surgery--unless otherwise directed by surgeon.  Hold Naproxen (Aleve) for 4 days before surgery.    -  DO NOT take these medications the day of surgery:    Hydrochlorothiazide   Losartan(Cozaar)    -  PLEASE TAKE these medications the day of surgery:    Albuterol inhaler as needed and bring the day of surgery    Escitalopram(Lexapro)     How do I prepare myself?  - Please take 2 showers before surgery using Scrubcare or Hibiclens soap.    Use this soap only from the neck to your toes.     Leave the soap on your skin for one minute--then rinse thoroughly.      You may use your own shampoo and conditioner; no other hair products.   - Please remove all jewelry and body piercings.  - No lotions, deodorants or fragrance.  - No makeup or fingernail polish.   - Bring your ID and insurance card.    -If you have a Deep Brain Stimulator, Spinal Cord Stimulator or any neuro stimulator device---you must bring the remote control to the hospital     - All patients are required to have a Covid-19 test within 4 days of surgery/procedure.      -Patients will be contacted by the [facility] scheduling team within 1 week of surgery to make an appointment.      - Patients may call the Scheduling team at [phone] if they have not been scheduled within 4 days of  surgery.      ALL PATIENTS GOING HOME THE SAME DAY OF SURGERY ARE REQUIRED TO HAVE A RESPONSIBLE ADULT TO DRIVE AND BE IN ATTENDANCE WITH THEM FOR 24 HOURS FOLLOWING SURGERY.      Questions or Concerns:    - For any questions regarding the day of surgery or your hospital stay, please contact the Pre Admission Nursing Office at [phone].        - If you have health changes between today and your surgery please call your surgeon.       For questions after surgery please call your surgeons office.         OPTIMAL RECOVERY AFTER SURGERY        Begin hydrating yourself by drinking at least 8-10 glasses of clear liquids for 24 hours before surgery:      Suggested clear liquids:   Water    Clear Juices   Clear Broth   Non- carbonated beverages    (Crystal Light or Mahendra Aid)   Sodas    (Sprite, 7 up, ginger ale, seltzer)   Gatorade              Drink clear liquids up until 2 hours before your surgery.       We would like you to purchase a drink such as Gatorade or Ensure Clear (not the milkshake type).  Drink this before bedtime and on the way into the hospital, drink between 8-10 ounces or until you feel hydrated.        Keeping well hydrated leads to your veins being plump, you wake up faster, and you are less likely to be nauseated. Start drinking water as soon as you can after surgery and advance to clear liquids and food as tolerated.  IV fluids contain salt, drinking fluids will minimize the amount of IV fluids you need and decrease the amount of salt you get.                 The most common reason for the patient to be readmitted is dehydration. Staying hydrated after you go home from the hospital is very important.  Ensure or Ensure Clear are good options to keep you hydrated.

## 2021-09-28 LAB
ATRIAL RATE - MUSE: 68 BPM
DIASTOLIC BLOOD PRESSURE - MUSE: NORMAL MMHG
INTERPRETATION ECG - MUSE: NORMAL
P AXIS - MUSE: 9 DEGREES
PR INTERVAL - MUSE: 144 MS
QRS DURATION - MUSE: 90 MS
QT - MUSE: 398 MS
QTC - MUSE: 423 MS
R AXIS - MUSE: 25 DEGREES
SARS-COV-2 RNA RESP QL NAA+PROBE: NEGATIVE
SYSTOLIC BLOOD PRESSURE - MUSE: NORMAL MMHG
T AXIS - MUSE: 29 DEGREES
VENTRICULAR RATE- MUSE: 68 BPM

## 2021-09-29 ENCOUNTER — HOSPITAL ENCOUNTER (OUTPATIENT)
Facility: CLINIC | Age: 71
Discharge: HOME | End: 2021-09-30
Attending: ORTHOPAEDIC SURGERY | Admitting: ORTHOPAEDIC SURGERY
Payer: MEDICARE

## 2021-09-29 ENCOUNTER — ANESTHESIA (OUTPATIENT)
Dept: SURGERY | Facility: CLINIC | Age: 71
End: 2021-09-29
Payer: MEDICARE

## 2021-09-29 ENCOUNTER — APPOINTMENT (OUTPATIENT)
Dept: GENERAL RADIOLOGY | Facility: CLINIC | Age: 71
End: 2021-09-29
Attending: ORTHOPAEDIC SURGERY
Payer: MEDICARE

## 2021-09-29 VITALS
RESPIRATION RATE: 16 BRPM | SYSTOLIC BLOOD PRESSURE: 94 MMHG | WEIGHT: 182.32 LBS | OXYGEN SATURATION: 96 % | HEIGHT: 65 IN | HEART RATE: 73 BPM | BODY MASS INDEX: 30.38 KG/M2 | TEMPERATURE: 98 F | DIASTOLIC BLOOD PRESSURE: 54 MMHG

## 2021-09-29 DIAGNOSIS — M25.551 RIGHT HIP PAIN: ICD-10-CM

## 2021-09-29 LAB
ANION GAP SERPL CALCULATED.3IONS-SCNC: 6 MMOL/L (ref 3–14)
BUN SERPL-MCNC: 13 MG/DL (ref 7–30)
CALCIUM SERPL-MCNC: 7.6 MG/DL (ref 8.5–10.1)
CHLORIDE BLD-SCNC: 99 MMOL/L (ref 94–109)
CO2 SERPL-SCNC: 26 MMOL/L (ref 20–32)
CREAT SERPL-MCNC: 0.75 MG/DL (ref 0.52–1.04)
ERYTHROCYTE [DISTWIDTH] IN BLOOD BY AUTOMATED COUNT: 11.9 % (ref 10–15)
GFR SERPL CREATININE-BSD FRML MDRD: 82 ML/MIN/1.73M2
GLUCOSE BLD-MCNC: 158 MG/DL (ref 70–99)
GLUCOSE BLDC GLUCOMTR-MCNC: 95 MG/DL (ref 70–99)
HCT VFR BLD AUTO: 32.5 % (ref 35–47)
HGB BLD-MCNC: 11.3 G/DL (ref 11.7–15.7)
MCH RBC QN AUTO: 31.1 PG (ref 26.5–33)
MCHC RBC AUTO-ENTMCNC: 34.8 G/DL (ref 31.5–36.5)
MCV RBC AUTO: 90 FL (ref 78–100)
PLATELET # BLD AUTO: 260 10E3/UL (ref 150–450)
POTASSIUM BLD-SCNC: 3.2 MMOL/L (ref 3.4–5.3)
RBC # BLD AUTO: 3.63 10E6/UL (ref 3.8–5.2)
SODIUM SERPL-SCNC: 131 MMOL/L (ref 133–144)
WBC # BLD AUTO: 10.6 10E3/UL (ref 4–11)

## 2021-09-29 PROCEDURE — C1776 JOINT DEVICE (IMPLANTABLE): HCPCS | Performed by: ORTHOPAEDIC SURGERY

## 2021-09-29 PROCEDURE — 250N000011 HC RX IP 250 OP 636: Performed by: ORTHOPAEDIC SURGERY

## 2021-09-29 PROCEDURE — 99204 OFFICE O/P NEW MOD 45 MIN: CPT | Performed by: INTERNAL MEDICINE

## 2021-09-29 PROCEDURE — 250N000013 HC RX MED GY IP 250 OP 250 PS 637: Performed by: ORTHOPAEDIC SURGERY

## 2021-09-29 PROCEDURE — 710N000010 HC RECOVERY PHASE 1, LEVEL 2, PER MIN: Performed by: ORTHOPAEDIC SURGERY

## 2021-09-29 PROCEDURE — 258N000001 HC RX 258: Performed by: ORTHOPAEDIC SURGERY

## 2021-09-29 PROCEDURE — 73502 X-RAY EXAM HIP UNI 2-3 VIEWS: CPT | Mod: 26 | Performed by: RADIOLOGY

## 2021-09-29 PROCEDURE — 999N000065 XR PELVIS AND HIP RIGHT 2 VIEWS

## 2021-09-29 PROCEDURE — C1713 ANCHOR/SCREW BN/BN,TIS/BN: HCPCS | Performed by: ORTHOPAEDIC SURGERY

## 2021-09-29 PROCEDURE — 36415 COLL VENOUS BLD VENIPUNCTURE: CPT | Performed by: INTERNAL MEDICINE

## 2021-09-29 PROCEDURE — 250N000013 HC RX MED GY IP 250 OP 250 PS 637: Performed by: ANESTHESIOLOGY

## 2021-09-29 PROCEDURE — 999N000141 HC STATISTIC PRE-PROCEDURE NURSING ASSESSMENT: Performed by: ORTHOPAEDIC SURGERY

## 2021-09-29 PROCEDURE — 258N000003 HC RX IP 258 OP 636: Performed by: ORTHOPAEDIC SURGERY

## 2021-09-29 PROCEDURE — 258N000003 HC RX IP 258 OP 636: Performed by: ANESTHESIOLOGY

## 2021-09-29 PROCEDURE — 250N000009 HC RX 250: Performed by: ORTHOPAEDIC SURGERY

## 2021-09-29 PROCEDURE — 36415 COLL VENOUS BLD VENIPUNCTURE: CPT | Performed by: ANESTHESIOLOGY

## 2021-09-29 PROCEDURE — 85027 COMPLETE CBC AUTOMATED: CPT | Performed by: ANESTHESIOLOGY

## 2021-09-29 PROCEDURE — 80048 BASIC METABOLIC PNL TOTAL CA: CPT | Performed by: INTERNAL MEDICINE

## 2021-09-29 PROCEDURE — 250N000009 HC RX 250: Performed by: STUDENT IN AN ORGANIZED HEALTH CARE EDUCATION/TRAINING PROGRAM

## 2021-09-29 PROCEDURE — 82962 GLUCOSE BLOOD TEST: CPT

## 2021-09-29 PROCEDURE — 272N000001 HC OR GENERAL SUPPLY STERILE: Performed by: ORTHOPAEDIC SURGERY

## 2021-09-29 PROCEDURE — 370N000017 HC ANESTHESIA TECHNICAL FEE, PER MIN: Performed by: ORTHOPAEDIC SURGERY

## 2021-09-29 PROCEDURE — 250N000011 HC RX IP 250 OP 636: Performed by: STUDENT IN AN ORGANIZED HEALTH CARE EDUCATION/TRAINING PROGRAM

## 2021-09-29 PROCEDURE — 250N000025 HC SEVOFLURANE, PER MIN: Performed by: ORTHOPAEDIC SURGERY

## 2021-09-29 PROCEDURE — 250N000011 HC RX IP 250 OP 636: Performed by: REGISTERED NURSE

## 2021-09-29 PROCEDURE — 360N000077 HC SURGERY LEVEL 4, PER MIN: Performed by: ORTHOPAEDIC SURGERY

## 2021-09-29 DEVICE — IMP HEAD FEMORAL SNR OXINIUM 12/14 36MM +0 71343600: Type: IMPLANTABLE DEVICE | Site: HIP | Status: FUNCTIONAL

## 2021-09-29 DEVICE — IMP LINER SNR ACET R3 XLPE 0DEG 36X52MM 71332752: Type: IMPLANTABLE DEVICE | Site: HIP | Status: FUNCTIONAL

## 2021-09-29 DEVICE — IMP SCR ACET SNN SPHERICAL HEAD 6.5X35MM 71332535: Type: IMPLANTABLE DEVICE | Site: HIP | Status: FUNCTIONAL

## 2021-09-29 DEVICE — IMP STEM FEM HIP SNN SYNERGY POROUS SZ 13 HO 71306113: Type: IMPLANTABLE DEVICE | Site: HIP | Status: FUNCTIONAL

## 2021-09-29 DEVICE — IMP SCR ACET SNN SPHERICAL HEAD 6.5X20MM 71332520: Type: IMPLANTABLE DEVICE | Site: HIP | Status: FUNCTIONAL

## 2021-09-29 DEVICE — IMP SHELL SNR ACET R3 3H 52MM 71335552: Type: IMPLANTABLE DEVICE | Site: HIP | Status: FUNCTIONAL

## 2021-09-29 RX ORDER — FENTANYL CITRATE 50 UG/ML
INJECTION, SOLUTION INTRAMUSCULAR; INTRAVENOUS PRN
Status: DISCONTINUED | OUTPATIENT
Start: 2021-09-29 | End: 2021-09-29

## 2021-09-29 RX ORDER — FENTANYL CITRATE 50 UG/ML
25 INJECTION, SOLUTION INTRAMUSCULAR; INTRAVENOUS EVERY 5 MIN PRN
Status: DISCONTINUED | OUTPATIENT
Start: 2021-09-29 | End: 2021-09-29 | Stop reason: HOSPADM

## 2021-09-29 RX ORDER — NALOXONE HYDROCHLORIDE 0.4 MG/ML
0.2 INJECTION, SOLUTION INTRAMUSCULAR; INTRAVENOUS; SUBCUTANEOUS
Status: DISCONTINUED | OUTPATIENT
Start: 2021-09-29 | End: 2021-09-29

## 2021-09-29 RX ORDER — ONDANSETRON 4 MG/1
4 TABLET, ORALLY DISINTEGRATING ORAL EVERY 6 HOURS PRN
Status: DISCONTINUED | OUTPATIENT
Start: 2021-09-29 | End: 2021-09-30 | Stop reason: HOSPADM

## 2021-09-29 RX ORDER — HYDROCHLOROTHIAZIDE 12.5 MG/1
25 CAPSULE ORAL EVERY MORNING
Status: DISCONTINUED | OUTPATIENT
Start: 2021-09-30 | End: 2021-09-29

## 2021-09-29 RX ORDER — MEPERIDINE HYDROCHLORIDE 25 MG/ML
12.5 INJECTION INTRAMUSCULAR; INTRAVENOUS; SUBCUTANEOUS
Status: DISCONTINUED | OUTPATIENT
Start: 2021-09-29 | End: 2021-09-29 | Stop reason: HOSPADM

## 2021-09-29 RX ORDER — MAGNESIUM HYDROXIDE 1200 MG/15ML
LIQUID ORAL PRN
Status: DISCONTINUED | OUTPATIENT
Start: 2021-09-29 | End: 2021-09-29 | Stop reason: HOSPADM

## 2021-09-29 RX ORDER — ONDANSETRON 2 MG/ML
4 INJECTION INTRAMUSCULAR; INTRAVENOUS EVERY 30 MIN PRN
Status: DISCONTINUED | OUTPATIENT
Start: 2021-09-29 | End: 2021-09-29 | Stop reason: HOSPADM

## 2021-09-29 RX ORDER — DEXAMETHASONE SODIUM PHOSPHATE 4 MG/ML
INJECTION, SOLUTION INTRA-ARTICULAR; INTRALESIONAL; INTRAMUSCULAR; INTRAVENOUS; SOFT TISSUE PRN
Status: DISCONTINUED | OUTPATIENT
Start: 2021-09-29 | End: 2021-09-29

## 2021-09-29 RX ORDER — ONDANSETRON 4 MG/1
4 TABLET, ORALLY DISINTEGRATING ORAL EVERY 30 MIN PRN
Status: DISCONTINUED | OUTPATIENT
Start: 2021-09-29 | End: 2021-09-29 | Stop reason: HOSPADM

## 2021-09-29 RX ORDER — PROPOFOL 10 MG/ML
INJECTION, EMULSION INTRAVENOUS PRN
Status: DISCONTINUED | OUTPATIENT
Start: 2021-09-29 | End: 2021-09-29

## 2021-09-29 RX ORDER — HYDROMORPHONE HYDROCHLORIDE 1 MG/ML
0.2 INJECTION, SOLUTION INTRAMUSCULAR; INTRAVENOUS; SUBCUTANEOUS EVERY 5 MIN PRN
Status: DISCONTINUED | OUTPATIENT
Start: 2021-09-29 | End: 2021-09-29 | Stop reason: HOSPADM

## 2021-09-29 RX ORDER — HYDROMORPHONE HYDROCHLORIDE 1 MG/ML
0.5 INJECTION, SOLUTION INTRAMUSCULAR; INTRAVENOUS; SUBCUTANEOUS
Status: DISCONTINUED | OUTPATIENT
Start: 2021-09-29 | End: 2021-09-30 | Stop reason: HOSPADM

## 2021-09-29 RX ORDER — CEFAZOLIN SODIUM 2 G/100ML
2 INJECTION, SOLUTION INTRAVENOUS SEE ADMIN INSTRUCTIONS
Status: DISCONTINUED | OUTPATIENT
Start: 2021-09-29 | End: 2021-09-29 | Stop reason: HOSPADM

## 2021-09-29 RX ORDER — BISACODYL 10 MG
10 SUPPOSITORY, RECTAL RECTAL DAILY PRN
Status: DISCONTINUED | OUTPATIENT
Start: 2021-09-29 | End: 2021-09-30 | Stop reason: HOSPADM

## 2021-09-29 RX ORDER — LOSARTAN POTASSIUM 50 MG/1
50-100 TABLET ORAL 2 TIMES DAILY
Status: DISCONTINUED | OUTPATIENT
Start: 2021-09-29 | End: 2021-09-29

## 2021-09-29 RX ORDER — OXYCODONE HYDROCHLORIDE 10 MG/1
10 TABLET ORAL EVERY 4 HOURS PRN
Status: DISCONTINUED | OUTPATIENT
Start: 2021-09-29 | End: 2021-09-30 | Stop reason: HOSPADM

## 2021-09-29 RX ORDER — ACETAMINOPHEN 325 MG/1
975 TABLET ORAL ONCE
Status: DISCONTINUED | OUTPATIENT
Start: 2021-09-29 | End: 2021-09-29 | Stop reason: HOSPADM

## 2021-09-29 RX ORDER — PROCHLORPERAZINE MALEATE 5 MG
5 TABLET ORAL EVERY 6 HOURS PRN
Status: DISCONTINUED | OUTPATIENT
Start: 2021-09-29 | End: 2021-09-30 | Stop reason: HOSPADM

## 2021-09-29 RX ORDER — NALOXONE HYDROCHLORIDE 0.4 MG/ML
0.4 INJECTION, SOLUTION INTRAMUSCULAR; INTRAVENOUS; SUBCUTANEOUS
Status: DISCONTINUED | OUTPATIENT
Start: 2021-09-29 | End: 2021-09-29

## 2021-09-29 RX ORDER — ONDANSETRON 2 MG/ML
INJECTION INTRAMUSCULAR; INTRAVENOUS PRN
Status: DISCONTINUED | OUTPATIENT
Start: 2021-09-29 | End: 2021-09-29

## 2021-09-29 RX ORDER — ALBUTEROL SULFATE 90 UG/1
2 AEROSOL, METERED RESPIRATORY (INHALATION) EVERY 6 HOURS PRN
Status: DISCONTINUED | OUTPATIENT
Start: 2021-09-29 | End: 2021-09-30 | Stop reason: HOSPADM

## 2021-09-29 RX ORDER — EPHEDRINE SULFATE 50 MG/ML
INJECTION, SOLUTION INTRAMUSCULAR; INTRAVENOUS; SUBCUTANEOUS PRN
Status: DISCONTINUED | OUTPATIENT
Start: 2021-09-29 | End: 2021-09-29

## 2021-09-29 RX ORDER — FENTANYL CITRATE 50 UG/ML
25 INJECTION, SOLUTION INTRAMUSCULAR; INTRAVENOUS
Status: DISCONTINUED | OUTPATIENT
Start: 2021-09-29 | End: 2021-09-29 | Stop reason: HOSPADM

## 2021-09-29 RX ORDER — POLYETHYLENE GLYCOL 3350 17 G/17G
17 POWDER, FOR SOLUTION ORAL DAILY
Status: DISCONTINUED | OUTPATIENT
Start: 2021-09-30 | End: 2021-09-30 | Stop reason: HOSPADM

## 2021-09-29 RX ORDER — AMOXICILLIN 250 MG
1 CAPSULE ORAL 2 TIMES DAILY
Status: DISCONTINUED | OUTPATIENT
Start: 2021-09-29 | End: 2021-09-30 | Stop reason: HOSPADM

## 2021-09-29 RX ORDER — CEFAZOLIN SODIUM 2 G/100ML
2 INJECTION, SOLUTION INTRAVENOUS EVERY 8 HOURS
Status: COMPLETED | OUTPATIENT
Start: 2021-09-29 | End: 2021-09-30

## 2021-09-29 RX ORDER — CEFAZOLIN SODIUM 2 G/100ML
2 INJECTION, SOLUTION INTRAVENOUS
Status: COMPLETED | OUTPATIENT
Start: 2021-09-29 | End: 2021-09-29

## 2021-09-29 RX ORDER — ESCITALOPRAM OXALATE 10 MG/1
10 TABLET ORAL EVERY MORNING
Status: DISCONTINUED | OUTPATIENT
Start: 2021-09-30 | End: 2021-09-30 | Stop reason: HOSPADM

## 2021-09-29 RX ORDER — SODIUM CHLORIDE, SODIUM LACTATE, POTASSIUM CHLORIDE, CALCIUM CHLORIDE 600; 310; 30; 20 MG/100ML; MG/100ML; MG/100ML; MG/100ML
INJECTION, SOLUTION INTRAVENOUS CONTINUOUS
Status: DISCONTINUED | OUTPATIENT
Start: 2021-09-29 | End: 2021-09-29 | Stop reason: HOSPADM

## 2021-09-29 RX ORDER — ZOLPIDEM TARTRATE 5 MG/1
5 TABLET ORAL
Status: DISCONTINUED | OUTPATIENT
Start: 2021-09-29 | End: 2021-09-30 | Stop reason: HOSPADM

## 2021-09-29 RX ORDER — SODIUM CHLORIDE, SODIUM LACTATE, POTASSIUM CHLORIDE, CALCIUM CHLORIDE 600; 310; 30; 20 MG/100ML; MG/100ML; MG/100ML; MG/100ML
INJECTION, SOLUTION INTRAVENOUS CONTINUOUS
Status: DISCONTINUED | OUTPATIENT
Start: 2021-09-29 | End: 2021-09-30 | Stop reason: HOSPADM

## 2021-09-29 RX ORDER — LIDOCAINE 40 MG/G
CREAM TOPICAL
Status: DISCONTINUED | OUTPATIENT
Start: 2021-09-29 | End: 2021-09-29 | Stop reason: HOSPADM

## 2021-09-29 RX ORDER — ACETAMINOPHEN 325 MG/1
975 TABLET ORAL ONCE
Status: COMPLETED | OUTPATIENT
Start: 2021-09-29 | End: 2021-09-29

## 2021-09-29 RX ORDER — HALOPERIDOL 5 MG/ML
1 INJECTION INTRAMUSCULAR
Status: DISCONTINUED | OUTPATIENT
Start: 2021-09-29 | End: 2021-09-29 | Stop reason: HOSPADM

## 2021-09-29 RX ORDER — LIDOCAINE HYDROCHLORIDE 20 MG/ML
INJECTION, SOLUTION INFILTRATION; PERINEURAL PRN
Status: DISCONTINUED | OUTPATIENT
Start: 2021-09-29 | End: 2021-09-29

## 2021-09-29 RX ORDER — ASPIRIN 81 MG/1
162 TABLET ORAL DAILY
Status: DISCONTINUED | OUTPATIENT
Start: 2021-09-30 | End: 2021-09-30 | Stop reason: HOSPADM

## 2021-09-29 RX ORDER — HYDRALAZINE HYDROCHLORIDE 20 MG/ML
2.5-5 INJECTION INTRAMUSCULAR; INTRAVENOUS EVERY 10 MIN PRN
Status: DISCONTINUED | OUTPATIENT
Start: 2021-09-29 | End: 2021-09-29 | Stop reason: HOSPADM

## 2021-09-29 RX ORDER — LIDOCAINE 40 MG/G
CREAM TOPICAL
Status: DISCONTINUED | OUTPATIENT
Start: 2021-09-29 | End: 2021-09-30 | Stop reason: HOSPADM

## 2021-09-29 RX ORDER — ONDANSETRON 2 MG/ML
4 INJECTION INTRAMUSCULAR; INTRAVENOUS EVERY 6 HOURS PRN
Status: DISCONTINUED | OUTPATIENT
Start: 2021-09-29 | End: 2021-09-30 | Stop reason: HOSPADM

## 2021-09-29 RX ORDER — OXYCODONE HYDROCHLORIDE 5 MG/1
5 TABLET ORAL EVERY 4 HOURS PRN
Status: DISCONTINUED | OUTPATIENT
Start: 2021-09-29 | End: 2021-09-30 | Stop reason: HOSPADM

## 2021-09-29 RX ORDER — ACETAMINOPHEN 325 MG/1
975 TABLET ORAL EVERY 8 HOURS
Status: DISCONTINUED | OUTPATIENT
Start: 2021-09-29 | End: 2021-09-30 | Stop reason: HOSPADM

## 2021-09-29 RX ORDER — METOPROLOL TARTRATE 1 MG/ML
1-2 INJECTION, SOLUTION INTRAVENOUS EVERY 5 MIN PRN
Status: DISCONTINUED | OUTPATIENT
Start: 2021-09-29 | End: 2021-09-29 | Stop reason: HOSPADM

## 2021-09-29 RX ORDER — TRANEXAMIC ACID 650 MG/1
1950 TABLET ORAL ONCE
Status: COMPLETED | OUTPATIENT
Start: 2021-09-29 | End: 2021-09-29

## 2021-09-29 RX ORDER — OXYCODONE HYDROCHLORIDE 5 MG/1
5 TABLET ORAL EVERY 4 HOURS PRN
Status: DISCONTINUED | OUTPATIENT
Start: 2021-09-29 | End: 2021-09-29 | Stop reason: HOSPADM

## 2021-09-29 RX ORDER — GABAPENTIN 100 MG/1
100 CAPSULE ORAL
Status: COMPLETED | OUTPATIENT
Start: 2021-09-29 | End: 2021-09-29

## 2021-09-29 RX ORDER — ACETAMINOPHEN 325 MG/1
650 TABLET ORAL EVERY 4 HOURS PRN
Status: DISCONTINUED | OUTPATIENT
Start: 2021-10-02 | End: 2021-09-30 | Stop reason: HOSPADM

## 2021-09-29 RX ADMIN — SODIUM CHLORIDE, POTASSIUM CHLORIDE, SODIUM LACTATE AND CALCIUM CHLORIDE 500 ML: 600; 310; 30; 20 INJECTION, SOLUTION INTRAVENOUS at 14:22

## 2021-09-29 RX ADMIN — Medication 10 MG: at 12:27

## 2021-09-29 RX ADMIN — Medication 5 MG: at 11:34

## 2021-09-29 RX ADMIN — HYDROMORPHONE HYDROCHLORIDE 0.5 MG: 1 INJECTION, SOLUTION INTRAMUSCULAR; INTRAVENOUS; SUBCUTANEOUS at 13:04

## 2021-09-29 RX ADMIN — CEFAZOLIN SODIUM 2 G: 2 INJECTION, SOLUTION INTRAVENOUS at 20:42

## 2021-09-29 RX ADMIN — TRANEXAMIC ACID 1950 MG: 650 TABLET ORAL at 09:24

## 2021-09-29 RX ADMIN — SODIUM CHLORIDE, POTASSIUM CHLORIDE, SODIUM LACTATE AND CALCIUM CHLORIDE: 600; 310; 30; 20 INJECTION, SOLUTION INTRAVENOUS at 11:07

## 2021-09-29 RX ADMIN — FENTANYL CITRATE 50 MCG: 50 INJECTION, SOLUTION INTRAMUSCULAR; INTRAVENOUS at 11:07

## 2021-09-29 RX ADMIN — DOCUSATE SODIUM AND SENNOSIDES 1 TABLET: 8.6; 5 TABLET ORAL at 20:42

## 2021-09-29 RX ADMIN — HYDROMORPHONE HYDROCHLORIDE 0.2 MG: 1 INJECTION, SOLUTION INTRAMUSCULAR; INTRAVENOUS; SUBCUTANEOUS at 15:11

## 2021-09-29 RX ADMIN — LIDOCAINE HYDROCHLORIDE 100 MG: 20 INJECTION, SOLUTION INFILTRATION; PERINEURAL at 11:07

## 2021-09-29 RX ADMIN — PROPOFOL 150 MG: 10 INJECTION, EMULSION INTRAVENOUS at 11:07

## 2021-09-29 RX ADMIN — ACETAMINOPHEN 975 MG: 325 TABLET, FILM COATED ORAL at 09:23

## 2021-09-29 RX ADMIN — ROCURONIUM BROMIDE 40 MG: 10 INJECTION INTRAVENOUS at 11:07

## 2021-09-29 RX ADMIN — FENTANYL CITRATE 25 MCG: 50 INJECTION, SOLUTION INTRAMUSCULAR; INTRAVENOUS at 14:37

## 2021-09-29 RX ADMIN — ONDANSETRON 4 MG: 2 INJECTION INTRAMUSCULAR; INTRAVENOUS at 12:57

## 2021-09-29 RX ADMIN — ACETAMINOPHEN 975 MG: 325 TABLET, FILM COATED ORAL at 18:05

## 2021-09-29 RX ADMIN — SODIUM CHLORIDE, POTASSIUM CHLORIDE, SODIUM LACTATE AND CALCIUM CHLORIDE 250 ML: 600; 310; 30; 20 INJECTION, SOLUTION INTRAVENOUS at 15:21

## 2021-09-29 RX ADMIN — ROCURONIUM BROMIDE 10 MG: 10 INJECTION INTRAVENOUS at 12:17

## 2021-09-29 RX ADMIN — ROCURONIUM BROMIDE 10 MG: 10 INJECTION INTRAVENOUS at 12:38

## 2021-09-29 RX ADMIN — FENTANYL CITRATE 50 MCG: 50 INJECTION, SOLUTION INTRAMUSCULAR; INTRAVENOUS at 12:17

## 2021-09-29 RX ADMIN — ONDANSETRON 4 MG: 2 INJECTION INTRAMUSCULAR; INTRAVENOUS at 13:53

## 2021-09-29 RX ADMIN — CEFAZOLIN 2 G: 10 INJECTION, POWDER, FOR SOLUTION INTRAVENOUS at 11:47

## 2021-09-29 RX ADMIN — Medication 10 MG: at 11:56

## 2021-09-29 RX ADMIN — PROCHLORPERAZINE EDISYLATE 5 MG: 5 INJECTION INTRAMUSCULAR; INTRAVENOUS at 14:09

## 2021-09-29 RX ADMIN — Medication 5 MG: at 11:42

## 2021-09-29 RX ADMIN — DEXAMETHASONE SODIUM PHOSPHATE 4 MG: 4 INJECTION, SOLUTION INTRAMUSCULAR; INTRAVENOUS at 11:59

## 2021-09-29 RX ADMIN — MIDAZOLAM 2 MG: 1 INJECTION INTRAMUSCULAR; INTRAVENOUS at 11:03

## 2021-09-29 RX ADMIN — FENTANYL CITRATE 25 MCG: 50 INJECTION, SOLUTION INTRAMUSCULAR; INTRAVENOUS at 14:31

## 2021-09-29 RX ADMIN — Medication 5 MG: at 11:39

## 2021-09-29 RX ADMIN — GABAPENTIN 100 MG: 100 CAPSULE ORAL at 09:23

## 2021-09-29 ASSESSMENT — MIFFLIN-ST. JEOR: SCORE: 1352.88

## 2021-09-29 NOTE — CONSULTS
HOSPITALIST INITIAL CONSULT NOTE    Referring Provider:  Dominic Weiss MD      Reason for Consult         History of Present Illness     Dorene Ortega is 71 year old year old female with hx of  mild aortic stenosis, HTN, former smoker, asthma, migraines, obesity, history of C. difficile, insomnia, osteopenia is being admitted s/p Right KAREN on 09/29/2021    Currently reports doing well. No nausea, vomiting, chest pain, shortness of breath, fever, chills, lightheadedness or dizziness            Past Medical History     Past Medical History:   Diagnosis Date     Asthma      BMI 30.0-30.9,adult      Dermatitis      History of Clostridioides difficile infection      HTN (hypertension)      Insomnia      Migraine with aura and without status migrainosus, not intractable      Mild aortic stenosis      Osteoarthritis      Osteopenia           Past Surgical History     Past Surgical History:   Procedure Laterality Date     ARTHROSCOPY KNEE       BLEPHAROPLASTY       CATARACT EXTRACTION       COLONOSCOPY       HIP ABDUCTOR TEAR REPAIR Left      LUMBAR LAMINECTOMY       MAMMOPLASTY REDUCTION       NISSEN FUNDOPLICATION       ORIF Left thumb proximal phalanx        TOTAL HIP ARTHROPLASTY Left      TOTAL KNEE ARTHROPLASTY Right           Medications     All his current medications are reviewed in current medication section of [facility].  Home medications are reviewed.  Current Facility-Administered Medications   Medication     [START ON 10/2/2021] acetaminophen (TYLENOL) tablet 650 mg     acetaminophen (TYLENOL) tablet 975 mg     [START ON 9/30/2021] aspirin EC tablet 162 mg     bisacodyl (DULCOLAX) Suppository 10 mg     ceFAZolin (ANCEF) intermittent infusion 2 g in 100 mL dextrose PRE-MIX     [START ON 9/30/2021] escitalopram (LEXAPRO) tablet 10 mg     [START ON 9/30/2021] hydrochlorothiazide (MICROZIDE) capsule 25 mg     HYDROmorphone (PF) (DILAUDID) injection 0.5 mg     lactated ringers infusion     lidocaine (LMX4) cream      lidocaine 1 % 0.1-1 mL     losartan (COZAAR) tablet  mg     magnesium hydroxide (MILK OF MAGNESIA) suspension 30 mL     ondansetron (ZOFRAN-ODT) ODT tab 4 mg    Or     ondansetron (ZOFRAN) injection 4 mg     oxyCODONE (ROXICODONE) tablet 5 mg    Or     oxyCODONE IR (ROXICODONE) tablet 10 mg     [START ON 2021] polyethylene glycol (MIRALAX) Packet 17 g     prochlorperazine (COMPAZINE) injection 5 mg    Or     prochlorperazine (COMPAZINE) tablet 5 mg     ropivacaine (NAROPIN) 300 mg, ketorolac (TORADOL) 30 mg, EPINEPHrine (ADRENALIN) 0.6 mg in sodium chloride 0.9 % 100 mL (ORTHO GERALDO STANDARD DOSE)     senna-docusate (SENOKOT-S/PERICOLACE) 8.6-50 MG per tablet 1 tablet     sodium chloride (PF) 0.9% PF flush 3 mL     sodium chloride (PF) 0.9% PF flush 3 mL        Allergies        Allergies   Allergen Reactions     Penicillins Hives, Rash and Unknown     Hives as an adult        Quinolones Hives, Itching and Rash     Other reaction(s): Pruritus, Rash//Quinalones  Cipro  Cipro       Sulfa Drugs Hives        Family History     Family History   Problem Relation Age of Onset     Hemophilia Sister         carrier     Deep Vein Thrombosis (DVT) Sister      Anesthesia Reaction No family hx of           Social History     Social History     Socioeconomic History     Marital status: Single     Spouse name: Not on file     Number of children: Not on file     Years of education: Not on file     Highest education level: Not on file   Occupational History     Not on file   Tobacco Use     Smoking status: Former Smoker     Packs/day: 1.50     Years: 23.00     Pack years: 34.50     Quit date:      Years since quittin.7     Smokeless tobacco: Never Used   Substance and Sexual Activity     Alcohol use: Not Currently     Drug use: Never     Sexual activity: Not on file   Other Topics Concern     Not on file   Social History Narrative     Not on file     Social Determinants of Health     Financial Resource Strain:       "Difficulty of Paying Living Expenses:    Food Insecurity:      Worried About Running Out of Food in the Last Year:      Ran Out of Food in the Last Year:    Transportation Needs:      Lack of Transportation (Medical):      Lack of Transportation (Non-Medical):    Physical Activity:      Days of Exercise per Week:      Minutes of Exercise per Session:    Stress:      Feeling of Stress :    Social Connections:      Frequency of Communication with Friends and Family:      Frequency of Social Gatherings with Friends and Family:      Attends L[employer] Services:      Active Member of Clubs or Organizations:      Attends Club or Organization Meetings:      Marital Status:    Intimate Partner Violence:      Fear of Current or Ex-Partner:      Emotionally Abused:      Physically Abused:      Sexually Abused:         Review of Systems   A 10 point review of systems was taken and largely negative except for that which was stated above.      Physical Exam       Vital signs:    Blood pressure 103/66, pulse 68, temperature 97.3  F (36.3  C), temperature source Temporal, resp. rate 12, height 1.651 m (5' 5\"), weight 82.7 kg (182 lb 5.1 oz), SpO2 99 %, not currently breastfeeding.  Estimated body mass index is 30.34 kg/m  as calculated from the following:    Height as of this encounter: 1.651 m (5' 5\").    Weight as of this encounter: 82.7 kg (182 lb 5.1 oz).      Intake/Output Summary (Last 24 hours) at 9/29/2021 1741  Last data filed at 9/29/2021 1700  Gross per 24 hour   Intake 2006 ml   Output 200 ml   Net 1806 ml      Constitutional: Laying in bed in no acute distress  Eye: No icterus, no pallor  Mouth/ENT:   Cardiovascular: S1, S2 normal. Systolic murmur at aortic area  Respiratory: B/L CTA  GI: Soft, NT, BS+  : No cifuentes's catheter  Neurology: Alert, awake, and oriented. No tremors  Psych:   MSK: Right hip dressing in place  Integumentary:   Heme/Lymph/Imm:        Laboratory and Imaging Studies     Laboratory and Imaging " studies reviewed in the results review section of Epic. Pertinent studies are as below:    CMP  Recent Labs   Lab 09/29/21  0845 09/27/21  1342   NA  --  133   POTASSIUM  --  3.7   CHLORIDE  --  99   CO2  --  30   ANIONGAP  --  4   GLC 95 99   BUN  --  20   CR  --  0.85   GFRESTIMATED  --  70   RICARDO  --  9.4     CBC  Recent Labs   Lab 09/29/21  1628 09/27/21  1342   WBC 10.6 5.9   RBC 3.63* 4.22   HGB 11.3* 13.3   HCT 32.5* 37.5   MCV 90 89   MCH 31.1 31.5   MCHC 34.8 35.5   RDW 11.9 12.2    327     INRNo lab results found in last 7 days.  Arterial Blood GasNo lab results found in last 7 days.       Impression/Recommendations     71 year old year old female with hx of  mild aortic stenosis, HTN, former smoker, asthma, migraines, obesity, history of C. difficile, insomnia, osteopenia is being admitted s/p Right KAREN on 09/29/2021    # s/p Right KAREN on 09/29/2021:   On Analgesics, and dressing in place  - Wound cares, Dressings, Surgical pain management, DVT Prophylaxis  per primary team.   - Monitor anemia, hemodynamics, Input/Output  - Monitor Capnogrphy  - Encourage Incentive spirometry  - Laxatives for constipation prophylaxis    # Cardiac:  Hx of Mild aortic stenosis, HTN  Pre op note reviewed. Followed by cardiology and last seen on 10/26/20 with plans for 1 year follow up. Echo on 9/15/20 shows mild aortic stenosis.  On Losartan, and hydrochlorothiazide  - Hold Losartan, and hydrochlorothiazide  - BMP in AM    # Pulmonary:  Former smoker, hx of Asthma   During winter months with colds   - PRN albuterol.     # Migraines with aura   Controlled.     # Hx of  C diff around 2010    # Insomnia: PTA on Lexapro and ambien.   Takes regularly  - Continue     #  Dermatitis: Completed course of Cephalexin            Richmond Frankie  Internal Medicine/Hospitalist  [facility]  [phone]

## 2021-09-29 NOTE — ANESTHESIA PROCEDURE NOTES
Airway       Patient location during procedure: OR       Procedure Start/Stop Times: 9/29/2021 11:47 AM  Staff -        Anesthesiologist:  Karina Guido MD       Resident/Fellow: Chet Lara MD       Performed By: resident  Consent for Airway        Urgency: elective  Indications and Patient Condition       Indications for airway management: catrachita-procedural       Induction type:intravenous       Mask difficulty assessment: 1 - vent by mask    Final Airway Details       Final airway type: endotracheal airway       Successful airway: ETT - single  Endotracheal Airway Details        ETT size (mm): 7.0Cuffed: no       Successful intubation technique: video laryngoscopy       VL Blade Size: MAC 3       Grade View of Cords: 3       Adjucts: stylet       Position: Right       Measured from: gums/teeth       Secured at (cm): 21       Bite block used: Oral Airway    Post intubation assessment        Placement verified by: capnometry, equal breath sounds and chest rise        Number of attempts at approach: 2       Number of other approaches attempted: 0       Secured with: silk tape and plastic tape       Ease of procedure: difficult       Dentition: Intact and Unchanged    Additional Comments       Unsuccessful with DL.

## 2021-09-29 NOTE — ANESTHESIA CARE TRANSFER NOTE
Patient: Dorene Ortega    Procedure(s):  RIGHT TOTAL HIP ARTHROPLASTY    Diagnosis: Right hip pain [M25.551]  Diagnosis Additional Information: No value filed.    Anesthesia Type:   General     Note:    Oropharynx: oropharynx clear of all foreign objects  Level of Consciousness: awake  Oxygen Supplementation: face mask  Level of Supplemental Oxygen (L/min / FiO2): 6  Independent Airway: airway patency satisfactory and stable  Dentition: dentition unchanged  Vital Signs Stable: post-procedure vital signs reviewed and stable  Report to RN Given: handoff report given  Patient transferred to: PACU  Comments: VSS. Breathing spontaneously at a regular rate with adequate tidal volumes and maintaining O2 sats. Some nausea, no pain. No apparent complications from anesthesia.     Chet Lara MD   Anesthesia CA-1    Handoff Report: Identifed the Patient, Identified the Reponsible Provider, Reviewed the pertinent medical history, Discussed the surgical course, Reviewed Intra-OP anesthesia mangement and issues during anesthesia, Set expectations for post-procedure period and Allowed opportunity for questions and acknowledgement of understanding      Vitals:  Vitals Value Taken Time   /76 09/29/21 1345   Temp     Pulse 93 09/29/21 1355   Resp 10 09/29/21 1355   SpO2 99 % 09/29/21 1355   Vitals shown include unvalidated device data.    Electronically Signed By: Chet Lara MD  September 29, 2021  1:55 PM

## 2021-09-29 NOTE — OR NURSING
PACU to Inpatient Nursing Handoff    Patient Dorene Ortega is a 71 year old female who speaks English.   Procedure Procedure(s):  RIGHT TOTAL HIP ARTHROPLASTY   Surgeon(s) Primary: Dominic Weiss MD     Allergies   Allergen Reactions     Penicillins Hives, Rash and Unknown     Hives as an adult        Quinolones Hives, Itching and Rash     Other reaction(s): Pruritus, Rash//Quinalones  Cipro  Cipro       Sulfa Drugs Hives       Isolation  [unfilled]     Past Medical History   has a past medical history of Asthma, BMI 30.0-30.9,adult, Dermatitis, History of Clostridioides difficile infection, HTN (hypertension), Insomnia, Migraine with aura and without status migrainosus, not intractable, Mild aortic stenosis, Osteoarthritis, and Osteopenia.    Anesthesia General   Dermatome Level     Preop Meds acetaminophen (Tylenol) - time given: 0923  gabapentin (Neurontin) - time given: 0923  TXA - time given: 0924   Nerve block Not applicable   Intraop Meds dexamethasone (Decadron)  fentanyl (Sublimaze): 100 mcg total  hydromorphone (Dilaudid): 0.5 mg total  ondansetron (Zofran): last given at 1257  ephedrine and versed   Local Meds Yes - Local Cocktail (morphine, ropivacaine, epinephrine, Toradol)   Antibiotics cefazolin (Ancef) - last given at 1147     Pain Patient Currently in Pain: denies   PACU meds  fentanyl (Sublimaze): 50 mcg (total dose) last given at 1437   hydromorphone (Dilaudid): 0.2 mg (total dose) last given at 1511   ondansetron (Zofran): 4 mg (total dose) last given at 1353   prochlorperazine (Compazine): 5 mg (total dose) last given at 1409    PCA / epidural No   Capnography     Telemetry ECG Rhythm: Normal sinus rhythm   Inpatient Telemetry Monitor Ordered? No        Labs Glucose Lab Results   Component Value Date    GLC 95 09/29/2021    GLC 99 09/27/2021       Hgb Lab Results   Component Value Date    HGB 13.3 09/27/2021       INR No results found for: INR   PACU Imaging Completed     Wound/Incision  Incision/Surgical Site 09/29/21 Right Hip (Active)   Incision Assessment WDL 09/29/21 1344   Incision Drainage Amount None 09/29/21 1344   Dressing Intervention Clean, dry, intact 09/29/21 1344   Number of days: 0      CMS        Equipment ice pack and abductor pillow   Other LDA       IV Access Peripheral IV 09/29/21 Right Wrist (Active)   Site Assessment WDL 09/29/21 1344   Line Status Saline locked 09/29/21 1344   Dressing Intervention New dressing  09/29/21 1011   Phlebitis Scale 0-->no symptoms 09/29/21 1344   Infiltration Scale 0 09/29/21 1344   Number of days: 0       Peripheral IV 09/29/21 Left Hand (Active)   Site Assessment WDL 09/29/21 1344   Line Status Infusing 09/29/21 1344   Phlebitis Scale 0-->no symptoms 09/29/21 1344   Infiltration Scale 0 09/29/21 1344   Number of days: 0      Blood Products Not applicable    mL   Intake/Output Date 09/29/21 0700 - 09/30/21 0659   Shift 0636-7633 5561-4109 5251-4898 24 Hour Total   INTAKE   I.V. 1056   1056   IV Piggyback 100   100   Shift Total(mL/kg) 1156(13.98)   1156(13.98)   OUTPUT   Blood 200   200   Shift Total(mL/kg) 200(2.42)   200(2.42)   Weight (kg) 82.7 82.7 82.7 82.7      Drains / Zhao     Time of void PreOp Void Prior to Procedure: 1019 (09/29/21 1019)    PostOp Urine Occurrence: 1 (09/29/21 1018)    Diapered? No   Bladder Scan Bladder Scan Volume (mL): 180 ml (09/29/21 1500)   PO    nausea     Vitals    B/P: 98/63  T: 97.9  F (36.6  C)    Temp src: Axillary  P:  Pulse: 68 (09/29/21 1500)          R: 16  O2:  SpO2: 100 %    O2 Device: Nasal cannula (09/29/21 1500)    Oxygen Delivery: 2 LPM (09/29/21 1500)         Family/support present son at bedside   Patient belongings     Patient transported on bed   DC meds/scripts (obs/outpt) Not applicable   Inpatient Pain Meds Released? Yes       Special needs/considerations 750ml LR bolus given. CBC drawn   Tasks needing completion None       Tish Parkinson, GRADY  [facility] [id number]

## 2021-09-29 NOTE — ANESTHESIA POSTPROCEDURE EVALUATION
Patient: Dorene Ortega    Procedure(s):  RIGHT TOTAL HIP ARTHROPLASTY    Diagnosis:Right hip pain [M25.551]  Diagnosis Additional Information: No value filed.    Anesthesia Type:  General    Note:  Disposition: Inpatient   Postop Pain Control: Uneventful            Sign Out: Well controlled pain   PONV:    Neuro/Psych: Uneventful            Sign Out: Acceptable/Baseline neuro status   Airway/Respiratory: Uneventful            Sign Out: Acceptable/Baseline resp. status   CV/Hemodynamics: Uneventful            Sign Out: Acceptable CV status; No obvious hypovolemia; No obvious fluid overload   Other NRE:    DID A NON-ROUTINE EVENT OCCUR?     Event details/Postop Comments:  BPs borderline low    Stable and asymptomatic. Improved with fluid bolus. HgB checked and acceptable range.           Last vitals:  Vitals Value Taken Time   /66 09/29/21 1700   Temp 36.3  C (97.3  F) 09/29/21 1700   Pulse 72 09/29/21 1706   Resp 15 09/29/21 1706   SpO2 100 % 09/29/21 1708   Vitals shown include unvalidated device data.    Electronically Signed By: Neel Caal MD  September 29, 2021  6:23 PM

## 2021-09-30 ENCOUNTER — APPOINTMENT (OUTPATIENT)
Dept: PHYSICAL THERAPY | Facility: CLINIC | Age: 71
End: 2021-09-30
Attending: ORTHOPAEDIC SURGERY
Payer: MEDICARE

## 2021-09-30 ENCOUNTER — APPOINTMENT (OUTPATIENT)
Dept: OCCUPATIONAL THERAPY | Facility: CLINIC | Age: 71
End: 2021-09-30
Attending: ORTHOPAEDIC SURGERY
Payer: MEDICARE

## 2021-09-30 LAB
ANION GAP SERPL CALCULATED.3IONS-SCNC: 9 MMOL/L (ref 3–14)
BASOPHILS # BLD AUTO: 0 10E3/UL (ref 0–0.2)
BASOPHILS NFR BLD AUTO: 0 %
BUN SERPL-MCNC: 10 MG/DL (ref 7–30)
CALCIUM SERPL-MCNC: 8 MG/DL (ref 8.5–10.1)
CHLORIDE BLD-SCNC: 101 MMOL/L (ref 94–109)
CO2 SERPL-SCNC: 23 MMOL/L (ref 20–32)
CREAT SERPL-MCNC: 0.79 MG/DL (ref 0.52–1.04)
EOSINOPHIL # BLD AUTO: 0 10E3/UL (ref 0–0.7)
EOSINOPHIL NFR BLD AUTO: 0 %
ERYTHROCYTE [DISTWIDTH] IN BLOOD BY AUTOMATED COUNT: 12.3 % (ref 10–15)
GFR SERPL CREATININE-BSD FRML MDRD: 77 ML/MIN/1.73M2
GLUCOSE BLD-MCNC: 101 MG/DL (ref 70–99)
HCT VFR BLD AUTO: 32.5 % (ref 35–47)
HGB BLD-MCNC: 11 G/DL (ref 11.7–15.7)
HGB BLD-MCNC: 11 G/DL (ref 11.7–15.7)
HOLD SPECIMEN: NORMAL
IMM GRANULOCYTES # BLD: 0 10E3/UL
IMM GRANULOCYTES NFR BLD: 0 %
LYMPHOCYTES # BLD AUTO: 0.9 10E3/UL (ref 0.8–5.3)
LYMPHOCYTES NFR BLD AUTO: 8 %
MAGNESIUM SERPL-MCNC: 1.9 MG/DL (ref 1.6–2.3)
MCH RBC QN AUTO: 30.8 PG (ref 26.5–33)
MCHC RBC AUTO-ENTMCNC: 33.7 G/DL (ref 31.5–36.5)
MCV RBC AUTO: 91 FL (ref 78–100)
MONOCYTES # BLD AUTO: 1 10E3/UL (ref 0–1.3)
MONOCYTES NFR BLD AUTO: 9 %
NEUTROPHILS # BLD AUTO: 8.6 10E3/UL (ref 1.6–8.3)
NEUTROPHILS NFR BLD AUTO: 83 %
NRBC # BLD AUTO: 0 10E3/UL
NRBC BLD AUTO-RTO: 0 /100
PLATELET # BLD AUTO: 341 10E3/UL (ref 150–450)
POTASSIUM BLD-SCNC: 3.7 MMOL/L (ref 3.4–5.3)
RBC # BLD AUTO: 3.57 10E6/UL (ref 3.8–5.2)
SODIUM SERPL-SCNC: 133 MMOL/L (ref 133–144)
WBC # BLD AUTO: 10.6 10E3/UL (ref 4–11)

## 2021-09-30 PROCEDURE — 250N000011 HC RX IP 250 OP 636: Performed by: ORTHOPAEDIC SURGERY

## 2021-09-30 PROCEDURE — 97165 OT EVAL LOW COMPLEX 30 MIN: CPT | Mod: GO

## 2021-09-30 PROCEDURE — 97110 THERAPEUTIC EXERCISES: CPT | Mod: GP

## 2021-09-30 PROCEDURE — 85025 COMPLETE CBC W/AUTO DIFF WBC: CPT | Performed by: HOSPITALIST

## 2021-09-30 PROCEDURE — 80048 BASIC METABOLIC PNL TOTAL CA: CPT | Performed by: HOSPITALIST

## 2021-09-30 PROCEDURE — 85025 COMPLETE CBC W/AUTO DIFF WBC: CPT | Performed by: ORTHOPAEDIC SURGERY

## 2021-09-30 PROCEDURE — 36415 COLL VENOUS BLD VENIPUNCTURE: CPT | Performed by: ORTHOPAEDIC SURGERY

## 2021-09-30 PROCEDURE — 97535 SELF CARE MNGMENT TRAINING: CPT | Mod: GO,59

## 2021-09-30 PROCEDURE — 250N000013 HC RX MED GY IP 250 OP 250 PS 637: Performed by: ORTHOPAEDIC SURGERY

## 2021-09-30 PROCEDURE — 83735 ASSAY OF MAGNESIUM: CPT | Performed by: HOSPITALIST

## 2021-09-30 PROCEDURE — 97116 GAIT TRAINING THERAPY: CPT | Mod: GP

## 2021-09-30 PROCEDURE — 97530 THERAPEUTIC ACTIVITIES: CPT | Mod: GO

## 2021-09-30 PROCEDURE — 97161 PT EVAL LOW COMPLEX 20 MIN: CPT | Mod: GP

## 2021-09-30 PROCEDURE — 258N000003 HC RX IP 258 OP 636: Performed by: ORTHOPAEDIC SURGERY

## 2021-09-30 RX ORDER — POLYETHYLENE GLYCOL 3350 17 G/17G
17 POWDER, FOR SOLUTION ORAL DAILY
Qty: 7 PACKET | Refills: 0 | Status: SHIPPED | OUTPATIENT
Start: 2021-09-30

## 2021-09-30 RX ORDER — OXYCODONE HYDROCHLORIDE 5 MG/1
5 TABLET ORAL EVERY 4 HOURS PRN
Qty: 40 TABLET | Refills: 0 | Status: SHIPPED | OUTPATIENT
Start: 2021-09-30

## 2021-09-30 RX ORDER — ACETAMINOPHEN 325 MG/1
650 TABLET ORAL EVERY 4 HOURS PRN
Qty: 60 TABLET | Refills: 0 | Status: SHIPPED | OUTPATIENT
Start: 2021-10-02

## 2021-09-30 RX ORDER — AMOXICILLIN 250 MG
1 CAPSULE ORAL 2 TIMES DAILY
Qty: 30 TABLET | Refills: 0 | Status: SHIPPED | OUTPATIENT
Start: 2021-09-30

## 2021-09-30 RX ADMIN — ASPIRIN 162 MG: 81 TABLET, COATED ORAL at 08:26

## 2021-09-30 RX ADMIN — PROCHLORPERAZINE MALEATE 5 MG: 5 TABLET ORAL at 08:26

## 2021-09-30 RX ADMIN — CEFAZOLIN SODIUM 2 G: 2 INJECTION, SOLUTION INTRAVENOUS at 02:31

## 2021-09-30 RX ADMIN — ACETAMINOPHEN 975 MG: 325 TABLET, FILM COATED ORAL at 02:13

## 2021-09-30 RX ADMIN — SODIUM CHLORIDE, POTASSIUM CHLORIDE, SODIUM LACTATE AND CALCIUM CHLORIDE: 600; 310; 30; 20 INJECTION, SOLUTION INTRAVENOUS at 00:07

## 2021-09-30 RX ADMIN — POLYETHYLENE GLYCOL 3350 17 G: 17 POWDER, FOR SOLUTION ORAL at 08:26

## 2021-09-30 RX ADMIN — ESCITALOPRAM OXALATE 10 MG: 10 TABLET ORAL at 08:26

## 2021-09-30 RX ADMIN — DOCUSATE SODIUM AND SENNOSIDES 1 TABLET: 8.6; 5 TABLET ORAL at 08:26

## 2021-09-30 ASSESSMENT — ACTIVITIES OF DAILY LIVING (ADL): IADL_COMMENTS: HAS ASSIST WITH IADLS

## 2021-09-30 NOTE — PROGRESS NOTES
"Orthopedic Surgery Progress Note    Subjective / Interval Events:   Patient resting in bed; no acute distress; pain controlled.  Denies chest pain, palpitations, SOB.  Pain well controlled on current regimen.  Has been ambulating throughout the hallway.  Does report one episode of emesis throughout the night and some nausea in the immediate postoperative period.  Has since been doing better.  Denies numbness, tingling or loss of sensation in right lower extremity.    Objective:   Vital Signs Temp (24hrs), Av.4  F (36.3  C), Min:96  F (35.6  C), Max:98  F (36.7  C)    BP 94/54 (BP Location: Left arm)   Pulse 73   Temp 98  F (36.7  C) (Oral)   Resp 16   Ht 1.651 m (5' 5\")   Wt 82.7 kg (182 lb 5.1 oz)   SpO2 96%   BMI 30.34 kg/m        Physical Examination   General: no acute distress  CV: palpable pulses  Resp: Nonlabored breathing  RLE: dressing in tact; clean and dry      5/5 Iliopsoas, quadricep, tibialis anterior, extensor houses longus, gastrocsoleus.     Sensation to light touch intact in the L2-S1 dermatome distribution.    Dorsalis pedis pulse palpated to be 2/4.    Tolerates range of motion in all major joints     No significant pain or limitation of range of motion.     Output by Drain (mL) 21 0700 - 21 1459 21 1500 - 21 2259 21 2300 - 21 0659 21 0700 - 21 1459 21 1500 - 21 2259 21 2300 - 21 0659 21 0700 - 21 0706   Patient has no LDAs of requested type attached.       Labs (Last 24 hour):   Lab Results   Component Value Date    WBC 10.6 2021    HGB 11.0 2021    HCT 32.5 2021    MCV 90 2021     2021    RDW 11.9 2021     Lab Results   Component Value Date    BUN 13 2021     2021    CO2 26 2021    ANIONGAP 6 2021       All cultures:  No results for input(s): CULT in the last 168 hours.      Current Facility-Administered Medications:      [START " ON 10/2/2021] acetaminophen (TYLENOL) tablet 650 mg, 650 mg, Oral, Q4H PRN, Ian Huffman MD     acetaminophen (TYLENOL) tablet 975 mg, 975 mg, Oral, Q8H, Ian Huffman MD, 975 mg at 09/30/21 0213     albuterol (PROAIR HFA/PROVENTIL HFA/VENTOLIN HFA) 108 (90 Base) MCG/ACT inhaler 2 puff, 2 puff, Inhalation, Q6H PRN, Richmond David MD     aspirin EC tablet 162 mg, 162 mg, Oral, Daily, Ian Huffman MD     bisacodyl (DULCOLAX) Suppository 10 mg, 10 mg, Rectal, Daily PRN, Ian Huffman MD     escitalopram (LEXAPRO) tablet 10 mg, 10 mg, Oral, QAM, Ian Huffman MD     HYDROmorphone (PF) (DILAUDID) injection 0.5 mg, 0.5 mg, Intravenous, Q2H PRN, Ian Huffman MD     lactated ringers infusion, , Intravenous, Continuous, Ian Huffman MD, Last Rate: 75 mL/hr at 09/30/21 0007, New Bag at 09/30/21 0007     lidocaine (LMX4) cream, , Topical, Q1H PRN, Ian Huffman MD     lidocaine 1 % 0.1-1 mL, 0.1-1 mL, Other, Q1H PRN, Ian Huffman MD     magnesium hydroxide (MILK OF MAGNESIA) suspension 30 mL, 30 mL, Oral, Daily PRN, Ian Huffman MD     ondansetron (ZOFRAN-ODT) ODT tab 4 mg, 4 mg, Oral, Q6H PRN **OR** ondansetron (ZOFRAN) injection 4 mg, 4 mg, Intravenous, Q6H PRN, Ian Huffman MD     oxyCODONE (ROXICODONE) tablet 5 mg, 5 mg, Oral, Q4H PRN **OR** oxyCODONE IR (ROXICODONE) tablet 10 mg, 10 mg, Oral, Q4H PRN, Ian Huffman MD     polyethylene glycol (MIRALAX) Packet 17 g, 17 g, Oral, Daily, Ian Huffman MD     prochlorperazine (COMPAZINE) injection 5 mg, 5 mg, Intravenous, Q6H PRN **OR** prochlorperazine (COMPAZINE) tablet 5 mg, 5 mg, Oral, Q6H PRN, Ian Huffman MD     ropivacaine (NAROPIN) 300 mg, ketorolac (TORADOL) 30 mg, EPINEPHrine (ADRENALIN) 0.6 mg in sodium chloride 0.9 % 100 mL (ORTHO GERALDO STANDARD DOSE), , , PRN, Dominic Weiss MD, Given at 09/29/21 1304     senna-docusate (SENOKOT-S/PERICOLACE) 8.6-50 MG per tablet 1 tablet, 1 tablet, Oral, BID, Ian Huffman MD, 1 tablet at 09/29/21 2042     sodium  chloride (PF) 0.9% PF flush 3 mL, 3 mL, Intracatheter, Q8H, Ian Huffman MD, 3 mL at 09/30/21 0214     sodium chloride (PF) 0.9% PF flush 3 mL, 3 mL, Intracatheter, q1 min prn, Ian Huffman MD     zolpidem (AMBIEN) tablet 5 mg, 5 mg, Oral, At Bedtime PRN, Richmond David MD      Assessment / Plan:   71 year old female s/p right total hip arthroplasty with Dr. Weiss on 9/29/2021    Plan: Ortho Primary  Monitoring: Per unit protocol  Activity: Up with assist  Weight bearing status: WBAT in operative extremity  Range of motion: As tolerated  Antibiotics: Ancef x24 hours perioperatively.  Diet: Begin with clear fluids and progress diet as tolerated.  DVT prophylaxis: Mechanical + ASA  Bracing/Splinting: None  Elevation: Elevate operative extremity to keep at level of heart as much as possible.  Wound Care: Keep dressings in place until POD7  Drain: None  Pain management: PO pain control  X-rays: PACU  Physical Therapy: ROM, ADL's.  Labs: Trend Hgb on POD #1.  Consults: PT + Med Mng  Follow-up: Clinic with Dr. Weiss in 2 weeks  Disposition: Pending progress with therapies, pain control on orals, and medical stability, anticipate discharge to home on POD #1-2      Ian Huffman DO  Adult Reconstruction Fellow  Dept Orthopaedic Surgery, [facility]

## 2021-09-30 NOTE — OP NOTE
OPERATIVE REPORT    DATE OF SERVICE: 9/29    SURGEON: Dominic Weiss MD.    ASSISTANT(S): Ian Huffman DO    PREOPERATIVE DIAGNOSIS:  Osteoarthritis    POSTOPERATIVE DIAGNOSIS:  Osteoarthritis    OPERATION PERFORMED: right  total hip arthroplasty    IMPLANTS:    Implant Name Type Inv. Item Serial No.  Lot No. LRB No. Used Action   IMP SHELL SNR ACET R3 3H 52MM [id number] - [id number] Total Joint Component/Insert IMP SHELL SNR ACET R3 3H 52MM [id number]  [employer] [id number] Right 1 Implanted   IMP LINER SNR ACET R3 XLPE 0DEG [id number] - [id number] Total Joint Component/Insert IMP LINER SNR ACET R3 XLPE 0DEG [id number]  [employer] [id number] Right 1 Implanted   IMP SCR ACET SNN SPHERICAL HEAD 6.5X35MM [id number] - [id number] Metallic Hardware/[place] IMP SCR ACET SNN SPHERICAL HEAD 6.5X35MM [id number]  [employer] [id number] Right 1 Implanted   IMP SCR ACET SNN SPHERICAL HEAD 6.5X20MM [id number] - [id number] Metallic Hardware/[place] IMP SCR ACET SNN SPHERICAL HEAD 6.5X20MM [id number]  [employer] [id number] Right 1 Implanted   IMP STEM FEM HIP SNN SYNERGY POROUS SZ 13 HO [id number] - [id number] Total Joint Component/Insert IMP STEM FEM HIP SNN SYNERGY POROUS SZ 13 HO [id number]  [place]  [id number] Right 1 Implanted   IMP HEAD FEMORAL SNR OXINIUM 12/14 36MM +0 [id number] - [id number] Total Joint Component/Insert IMP HEAD FEMORAL SNR OXINIUM 12/14 36MM +0 [id number]  [employer] [id number] Right 1 Implanted         ANESTHETIC: general    OPERATIVE FINDINGS:  End stage arthrosis of the hip    BLOOD LOSS: about 250 ml     COMPLICATIONS:  None apparent    OPERATIVE INDICATIONS:  The patient has a long history of debilitating pain secondary to ostearthritis of the hip.  Despite comprehensive non-operative management these symptoms continued to interfere with activities of daily living.  After discussion of further treatment options including the risks and benefits that  patient elected to proceed with a total hip.    DESCRIPTION OF THE PROCEDURE:  The patient was identified in the preoperative holding area.  The consent form including the risks and benefits were reviewed with the patient.  The operative limb was identified and marked.  The patient was brought back to the operating room and placed supine on the operating table.  An anesthetic was induced by the anesthesia team.   The patient was placed in the lateral decubitus position and prepped and draped in the normal standard fashion for a hip replacement.  A time-out was called.  Antibiotics were given.  We utilized an approximately 15 cm curvilinear incision, centered on the vastus ridge, and performed a standard posterior approach to the hip.  The tensor fascia was split.  A small portion of gluteus cameron was split in line with its fibers.  The sciatic nerve was palpated.  The east-west retractor was placed.  The posterior border of gluteus medius was exposed and retracted.  The tendon of piriformis and that of the obturators was released from their attachments.  A trapdoor posterior capsulotomy was performed.  The hip was dislocated.  The lesser trochanter was exposed.  A ruler was used to measure and electrocautery was used to mavis our neck cut as preoperatively templated.  The head was measured with a caliper and found to be 48 mm.  This measurement was used to choose our first reamer.  The neck cut was re-measured. The femur was elevated.  A Hohmann was placed over the anterior rim of the acetabulum and the femur was subluxed anterior.  A split was made in the inferior capsule.  The transverse acetabular ligament was left intact and used a guide for the anterversion of the acetabular component.  Circumferential retractors were placed.  We began reaming and went up by two until sufficient contact was made with the acetabular rim.  We then went up by one millimeter for a one millimeter press-fit.  We were within one size  "of our preoperative plan.   A trail was placed.  It had an excellent press fit.  We then placed out final component in 40 degrees of inclination and approximately 20 degrees of anteversion, parallel to the transverse ligament.  The press fit was excellent.  Screws were placed for additional initial fixation.  A flat liner was then placed. It locked into place.  Attention was turned to the femur.  Retractors were placed to elevated the proximal femur and to protect the tendon of gluteus medius.  Remaining lateral neck was removed and the piriformis fossa was cleared of soft-tissue.  A box osteotome and canal finder were used to prepare for broaching.  A sharp broach was used to lateralize slightly.  We then reamed and broached up sequentially to a size 13.  It was rotationally stable and sat up 1-2 millimeters from the neck-cut.  Preoperatively the patient had templated to a high offset stem.  The high offset stem appropriately tensioned the abductors.  We trialed with a +-0 and it appropriately tensioned the abductors and clinically equalized the leg-lengths.  The stability exam was excellent.  The hip was stable and there was no impingement posteriorly with hyper-extension and maximal external rotation.  With full extension, the knee could be fixed to bring the foot nearly to the buttock.  With the hip in ninety degrees of flexion and neutral rotation there was greater than 60 degrees of internal rotation before subluxation.  There appropriate movement with a \"monika\" test.  Happy with our stability exam, the final implant was placed in approximately twenty degrees of anteversion.  It sat within 1  mm of the broach.  We then trailed with a 0 head.  The stability exam was identical.  We then placed the final head on a clean, dry neck and impacted it into place. The hip was reduced after directly visualizing the entire acetabulum.  The wound was then irrigated.  The posterior capsule and short external rotators were " sutured to the greater trochanter with non-absorbable suture through bone tunnels.The fascia was closed with interrupted Vicryl, the dermis with interrupted Vicryl, and skin with running monocryl, Dermabond and steri-strips.  At the end of the procedure the sponge and needle counts were correct times two.  The patient tolerated the procedure well and returned to the PAR extubated and stable.    POSTOPERATIVE PLAN:  1. Weight bearing as tolerated  2. Standard posterior hip precautions  3. DVT prophylaxis   4. 24 hours of prophylactic antibiotics  5. Follow-up:  Wound clinic in 2 weeks and with Abhishek in clinic in 6 weeks for x-rays and a rehabilitation check.

## 2021-09-30 NOTE — PROGRESS NOTES
"   09/30/21 1000   Quick Adds   Type of Visit Initial Occupational Therapy Evaluation   Living Environment   People in home alone   Current Living Arrangements house   Home Accessibility stairs to enter home   Number of Stairs, Main Entrance 3   Stair Railings, Main Entrance railings safe and in good condition   Number of Stairs, Within Home, Primary 1   Stair Railings, Within Home, Primary none   Transportation Anticipated family or friend will provide   Living Environment Comments visiting from [facility], plans to say with son, has access to raised toilet seat, walk in shower with shower bench, reacher, and sock aid.    Self-Care   Usual Activity Tolerance good   Current Activity Tolerance moderate   Regular Exercise No   Equipment Currently Used at Home none  (has RTS, reacher, sock aid, shower chair. )   Activity/Exercise/Self-Care Comment typically IND with ADL's, mobility   Instrumental Activities of Daily Living (IADL)   IADL Comments has assist with IADLs   Disability/Function   Hearing Difficulty or Deaf no   Wear Glasses or Blind yes   Vision Management wears glasses   Concentrating, Remembering or Making Decisions Difficulty no   Difficulty Communicating no   Difficulty Eating/Swallowing no   Walking or Climbing Stairs Difficulty no   Dressing/Bathing Difficulty no   Toileting issues no   Doing Errands Independently Difficulty (such as shopping) no   Fall history within last six months no   General Information   Onset of Illness/Injury or Date of Surgery 09/29/21   Referring Physician Ian Huffman MD   Patient/Family Therapy Goal Statement (OT) \"to get dressed and go home\"   Existing Precautions/Restrictions no hip IR;fall;90 degree hip flexion;no pivoting or twisting;no hip ADD past midline   Right Lower Extremity (Weight-bearing Status) weight-bearing as tolerated (WBAT)   Cognitive Status Examination   Orientation Status orientation to person, place and time   Affect/Mental Status (Cognitive) WFL "   Follows Commands WFL   Visual Perception   Visual Impairment/Limitations corrective lenses full-time   Sensory   Sensory Quick Adds No deficits were identified   Pain Assessment   Patient Currently in Pain Yes, see Vital Sign flowsheet   Range of Motion Comprehensive   General Range of Motion no range of motion deficits identified   Comment, General Range of Motion UE WFL   Strength Comprehensive (MMT)   General Manual Muscle Testing (MMT) Assessment no strength deficits identified   Comment, General Manual Muscle Testing (MMT) Assessment UE WFL   Muscle Tone Assessment   Muscle Tone Quick Adds No deficits were identified   Coordination   Upper Extremity Coordination No deficits were identified   Bed Mobility   Supine-Sit [place] (Bed Mobility) supervision   Assistive Device (Bed Mobility) bed rails   Comment (Bed Mobility) able to maintain precautions   Sit-Stand Transfer   Sit-Stand [place] (Transfers) contact guard   Assistive Device (Sit-Stand Transfers) walker, front-wheeled   Toilet Transfer   Type (Toilet Transfer) sit-stand   [place] Level (Toilet Transfer) minimum assist (75% patient effort);set up   Assistive Device (Toilet Transfer) walker, front-wheeled;grab bars/safety frame   Toilet Transfer Comments SBA with stand>sit, had difficulty standing from standard toilet plans to use RTS at home   Lower Body Dressing Assessment   [place] Level (Lower Body Dressing) supervision;verbal cues   Assistive Devices (Lower Body Dressing) reacher;sock-aid   Position (Lower Body Dressing) edge of bed sitting   Grooming Assessment   [place] Level (Grooming) supervision   Position (Grooming) supported standing   Toileting   [place] Level (Toileting) supervision;verbal cues   Assistive Devices (Toileting) reacher;grab bar, toilet   Position (Toileting) supported sitting   Clinical Impression   Criteria for Skilled Therapeutic Interventions Met (OT) yes   OT Diagnosis Decreased IND with  ADL's and functional mobility   OT Problem List-Impairments impacting ADL post-surgical precautions;balance;activity tolerance impaired;strength;pain   Assessment of Occupational Performance 3-5 Performance Deficits   Identified Performance Deficits dressing, mobility, toileting, bathing, home managment   Planned Therapy Interventions (OT) ADL retraining;transfer training   Clinical Decision Making Complexity (OT) low complexity   Therapy Frequency (OT) 1x eval and treat   Predicted Duration of Therapy 1 day   Anticipated Equipment Needs Upon Discharge (OT) reacher;shower chair;toileting equipment;raised toilet seat;walker, rolling;dressing equipment   Risk & Benefits of therapy have been explained care plan/treatment goals reviewed;patient   OT Discharge Planning    OT Discharge Recommendation (DC Rec) Home with assist   OT Rationale for DC Rec Pt will receive assistance from family for ADL's, IADL's.    OT Brief overview of current status  SBA using FWW for mobility, did min A to stand from standard toilet, SBA with adaptive equipment for LB dressing   Therapy Certification   Start of Care Date 09/30/21   Certification date from 09/30/21   Certification date to 10/01/21   Medical Diagnosis s/p L KAREN   Total Evaluation Time (Minutes)   Total Evaluation Time (Minutes) 8

## 2021-09-30 NOTE — CONSULTS
Care Management Discharge Note    Discharge Date: 09/30/2021       Discharge Disposition: Outpatient Rehab (PT, OT, SLP, Cardiac or Pulmonary)    Discharge Services:      URI Extremity with Pat Burciaga, PT   [facility] ([facility] [place] ) [address]   [facility] [id number] [phone]     [address]   [facility] [id number] [phone]    Discharge DME:      Discharge Transportation: family or friend will provide    Private pay costs discussed: Not applicable    Education Provided on the Discharge Plan: yes   Persons Notified of Discharge Plans: patient  Patient/Family in Agreement with the Plan: yes    Handoff Referral Completed: Yes    Additional Information:  Spoke with patient regarding discharge planning. Per patient request a referral for outpatient physical therapy was sent to the [facility] in [place]. Patient has already scheduled her outpatient therapy appointments. No further discharge concerns at this time. RNCC available as needed.    Jasmyne Borja RN, BSN  Care Coordinator, [facility] Ortho  Phone [phone]  Pager [phone]

## 2021-09-30 NOTE — PLAN OF CARE
[facility]      OUTPATIENT OCCUPATIONAL THERAPY  EVALUATION  PLAN OF TREATMENT FOR OUTPATIENT REHABILITATION  (COMPLETE FOR INITIAL CLAIMS ONLY)  Patient's Last Name, First Name, M.I.  YOB: 1950  Dorene Ortega                          Provider's Name  [facility] Medical Record No.  [id number]                               Onset Date:  09/29/21   Start of Care Date:  09/30/21     Type:     ___PT   _X_OT   ___SLP Medical Diagnosis:  s/p L KAREN                        OT Diagnosis:  Decreased IND with ADL's and functional mobility   Visits from SOC:  1   _________________________________________________________________________________  Plan of Treatment/Functional Goals    Planned Interventions: ADL retraining, transfer training   Goals: See Occupational Therapy Goals on Care Plan in [employer] electronic health record.    Therapy Frequency: 1x eval and treat  Predicted Duration of Therapy Intervention: 1 day  _________________________________________________________________________________    I CERTIFY THE NEED FOR THESE SERVICES FURNISHED UNDER        THIS PLAN OF TREATMENT AND WHILE UNDER MY CARE     (Physician co-signature of this document indicates review and certification of the therapy plan).              Certification date from: 09/30/21, Certification date to: 10/01/21    Referring Physician: Ian Huffman MD            Initial Assessment        See Occupational Therapy evaluation dated 09/30/21 in Epic electronic health record.                  
                                                                              [facility]      OUTPATIENT PHYSICAL THERAPY EVALUATION  PLAN OF TREATMENT FOR OUTPATIENT REHABILITATION  (COMPLETE FOR INITIAL CLAIMS ONLY)  Patient's Last Name, First Name, M.I.  YOB: 1950  Dorene Ortega                        Provider's Name  [facility] Medical Record No.  [id number]                               Onset Date:  09/29/21   Start of Care Date:         Type:     _X_PT   ___OT   ___SLP Medical Diagnosis:                           PT Diagnosis:  impaired functional mobility   Visits from SOC:  1   _________________________________________________________________________________  Plan of Treatment/Functional Goals    Planned Interventions: balance training, bed mobility training, gait training, home exercise program, strengthening, stair training, transfer training, progressive activity/exercise, risk factor education, home program guidelines     Goals: See Physical Therapy Goals on Care Plan in [facility] electronic health record.    Therapy Frequency: One time eval and treatment only  Predicted Duration of Therapy Intervention: 1 day  _________________________________________________________________________________    I CERTIFY THE NEED FOR THESE SERVICES FURNISHED UNDER        THIS PLAN OF TREATMENT AND WHILE UNDER MY CARE     (Physician co-signature of this document indicates review and certification of the therapy plan).               ,      Referring Physician: Ian Huffman MD (Dr. Dominic Weiss)           Initial Assessment        See Physical Therapy evaluation dated   in Epic electronic health record.  
        VS:     Pt A/O X 4. Afebrile. VSS on RA. Lungs- clear bilaterally with both anterior and lateral. IS encouraged. Denies nausea, shortness of breath, and chest pain.     Output:     Bowels- active in all four quadrants. Last BM 9/28. Pt not out of bed yet, so no void yet.     Activity:     Pt WBAT, not out of bed yet this shift.     Skin: Surgical incision to RLE. .     Pain:     Has pain in the RLE and given scheduled tylenol, ICE applied, and is tolerating well.      CMS:     CMS and Neuro's are intact. Denies numbness and tingling in all extremities.      Dressing:     Surgical incisional dressing is CDI.      Diet:     Pt is on a regular diet and appetite was good this shift.       LDA:     PIV in the right hand is patent and saline locked. PIV in the left hand is      Equipment:     Abductor in place, IV pole and capnography running, and PCDs on.     Plan:     Pt is able to make needs known and the call light is within the pt's reach. Continue to monitor.       Additional Info:                       
"  VS: BP 94/54 (BP Location: Left arm)   Pulse 73   Temp 98  F (36.7  C) (Oral)   Resp 16   Ht 1.651 m (5' 5\")   Wt 82.7 kg (182 lb 5.1 oz)   SpO2 96%   BMI 30.34 kg/m     O2: Stable on RA.   Output: Void spontaneously in the toilet.   Last BM: 9/29, per pt report.   Activity: SBA wit walker and gait belt.    Skin: Surgery to the right leg.   Pain: Minimal pain, PO oxycodone available when needed.   CMS: Intact. Denies numbness and tingling.    Dressing: ACE wrap and surgical dressing are CDI.   Diet: Regular diet and denies Nausea and vomiting.   LDA: PIV SL.   Equipment: Iv pole/pump, and abd pillow, persona belonging at bedside.    Plan: Home today before 11am. Waiting on discharge meds.   Additional Info:      DISCHARGE SUMMARY    Pt discharging to: home with family assist and outpatient Physical therapy.  Transportation: son provided.  AVS given and discussed: yes.  Medications given: yes.  Belongings returned: yes.  Comments:        "
"VS: /70 (BP Location: Left arm)   Pulse 73   Temp (!) 96  F (35.6  C) (Oral)   Resp 11   Ht 1.651 m (5' 5\")   Wt 82.7 kg (182 lb 5.1 oz)   SpO2 97%   BMI 30.34 kg/m      O2: 97% on room air. Lung sounds clear. Pt denies SOB,CP,  or cough   Output: Voids without difficulty in bathroom. Ambulates to BR   Last BM: 9/29/21. Bowel sounds active x 4.    Activity: Assist x 1 with gait belt and walker. .    Skin: Intact except the incision right hip   Pain: Denies    CMS /Neuro: A&O x 4. Denies numbness or tingling. Pt was nauseous and vomiting when first time got for bathroom. Felt better after sitting on totilet    Dressing: Right hip dressing CDI   Diet: Regular diet    LDA: PIV both arms, left infusing LR  75 ml/hr and right arm saline locked    Equipment: IV pole/pump, call light, walker, gait belt and personal items.   Plan: TBD   Additional Info: Pt was nauseous and vomiting when first time got up for bathroom. Pt  felt better after sitting on totilet for a while. Decline antiemetic and vitals stable.       "
Occupational Therapy Discharge Summary    Reason for therapy discharge:    Discharged to home.    Progress towards therapy goal(s). See goals on Care Plan in [facility] electronic health record for goal details.  Most goals met, all goals addressed.     Therapy recommendation(s):    Home with assist for ADLs/IADLs with use of DME.       
Physical Therapy Discharge Summary    Reason for therapy discharge:    All goals and outcomes met, no further needs identified.    Progress towards therapy goal(s). See goals on Care Plan in [facility] electronic health record for goal details.  Goals met    Therapy recommendation(s):    Continued therapy is recommended.  Rationale/Recommendations:  OP PT per KAREN protocol.      
no

## 2021-09-30 NOTE — DISCHARGE INSTRUCTIONS
Do not take your Losartan and hydrochlorothiazide for now, your blood pressure readings have been normal without them. Please check your BP daily at home & follow up with your primary care doctor in the next few days.

## 2021-09-30 NOTE — PROGRESS NOTES
09/30/21 0918   Quick Adds   Type of Visit Initial PT Evaluation       Present no   Language English   Living Environment   People in home alone   Current Living Arrangements house   Home Accessibility stairs to enter home;stairs within home   Number of Stairs, Main Entrance 3   Stair Railings, Main Entrance railings safe and in good condition;railings on both sides of stairs   Number of Stairs, Within Home, Primary 1   Stair Railings, Within Home, Primary none   Transportation Anticipated family or friend will provide   Living Environment Comments Patient in town from [place] with plan to stay with her son and DIL. 3 stairs to enter with bilateral rails and 1 in home   Self-Care   Usual Activity Tolerance good   Current Activity Tolerance moderate   Regular Exercise No   Equipment Currently Used at Home none  (has walker)   Disability/Function   Hearing Difficulty or Deaf no   Wear Glasses or Blind no   Concentrating, Remembering or Making Decisions Difficulty no   Difficulty Communicating no   Difficulty Eating/Swallowing no   Walking or Climbing Stairs Difficulty no   Dressing/Bathing Difficulty no   Toileting issues no   Doing Errands Independently Difficulty (such as shopping) no   Fall history within last six months no   General Information   Onset of Illness/Injury or Date of Surgery 09/29/21   Referring Physician Ian Huffman MD   Patient/Family Therapy Goals Statement (PT) Did not endorse   Pertinent History of Current Problem (include personal factors and/or comorbidities that impact the POC) s/p R KAREN   Existing Precautions/Restrictions fall;no hip IR;no hip ADD past midline;90 degree hip flexion;no pivoting or twisting   Weight-Bearing Status - LUE full weight-bearing   Weight-Bearing Status - RUE full weight-bearing   Weight-Bearing Status - LLE full weight-bearing   Weight-Bearing Status - RLE weight-bearing as tolerated   General Observations Supine in bed upon arrival,  agreeable to PT   Cognition   Orientation Status (Cognition) oriented x 3   Affect/Mental Status (Cognition) WNL   Follows Commands (Cognition) WNL   Pain Assessment   Patient Currently in Pain Yes, see Vital Sign flowsheet   Integumentary/Edema   Integumentary/Edema Comments Patient with normal post-surgical swelling present to R LE   Posture    Posture Forward head position;Protracted shoulders;Kyphosis   Posture Comments Mild sitting EOB and standing   Range of Motion (ROM)   ROM Comment Did not formally assess, demonstrates functional ROM with mobility   Strength   Strength Comments Did not formally assess, demonstrates functional strength with mobility   Bed Mobility   Comment (Bed Mobility) Completes supine to sit transfer with use of gait belt as leg  on R LE   Transfers   Transfer Safety Comments Completes sit<>stand transfer with SBA and use of walker   Gait/Stairs (Locomotion)   Comment (Gait/Stairs) Tolerates ambulating in [place] with supervision using walker, safe throughout   Balance   Balance Comments Independent sitting balance, supervision standing balance with UE support with walker   Sensory Examination   Sensory Perception WNL   Clinical Impression   Criteria for Skilled Therapeutic Intervention yes, treatment indicated   PT Diagnosis (PT) impaired functional mobility   Influenced by the following impairments increased post-op pain, precautions, decreased R LE strength and ROM   Functional limitations due to impairments impaired bed mobility, transfers and ambulation   Clinical Presentation Stable/Uncomplicated   Clinical Presentation Rationale s/p R KAREN, mobilizing well   Clinical Decision Making (Complexity) low complexity   Therapy Frequency (PT) One time eval and treatment only   Predicted Duration of Therapy Intervention (days/wks) 1 day   Planned Therapy Interventions (PT) balance training;bed mobility training;gait training;home exercise program;strengthening;stair training;transfer  training;progressive activity/exercise;risk factor education;home program guidelines   Risk & Benefits of therapy have been explained evaluation/treatment results reviewed;care plan/treatment goals reviewed;risks/benefits reviewed;current/potential barriers reviewed   PT Discharge Planning    PT Discharge Recommendation (DC Rec) home with assist;home with outpatient physical therapy   PT Rationale for DC Rec PT: For progression per KAREN protocol   PT Brief overview of current status  PT: Supervision for all mobility with use of walker   Total Evaluation Time   Total Evaluation Time (Minutes) 8

## 2021-10-04 NOTE — DISCHARGE SUMMARY
ORTHOPAEDIC SURGERY DISCHARGE SUMMARY     Date of Admission: 9/29/2021  Date of Discharge: 9/30/2021 10:48 AM  Disposition: Home  Staff Physician: No att. providers found  Primary Care Provider: System, Provider Not In    DISCHARGE DIAGNOSIS:  Right hip pain [M25.551]    PROCEDURES: Procedure(s):  RIGHT TOTAL HIP ARTHROPLASTY on 9/29/2021 with Dr. Weiss.    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  Despite comprehensive non-operative management these symptoms continued to interfere with activities of daily living.  After discussion of further treatment options including the risks and benefits that patient elected to proceed with a total hip.    HOSPITAL COURSE:    The patient was admitted following the above listed procedures for pain control and rehabilitation. Dorene Ortega did well post-operatively. Medicine was consulted post operatively to aid in management of medical co-morbidities. The patient received routine nursing cares and at the time of discharge was medically stable. The patient is tolerating a regular diet and is voiding spontaneously. All PT/OT goals have been met for safe mobility. Pain is now controlled on oral medications which will be available on discharge. Stool softeners have been used while taking pain medications to help prevent constipation. Dorene Ortega is deemed medically safe to discharge.     Antibiotics:  Ancef given periop and 24 hours postop.   DVT prophylaxis:  Aspirin 162mg initiated after surgery and will be continued for 4 weeks.   PT Progress:  Has met PT/OT goals for safe mobility.   Pain Meds:  Weaned off all IV pain meds by discharge.  Inpatient Events: No significant events or complications.     PHYSICAL EXAM:    General: no acute distress  CV: palpable pulses  Resp: Nonlabored breathing  RLE: dressing in tact; clean and dry       5/5 Iliopsoas, quadricep, tibialis anterior, extensor houses longus,  gastrocsoleus.     Sensation to light touch intact in the L2-S1 dermatome distribution.    Dorsalis pedis pulse palpated to be 2/4.    Tolerates range of motion in all major joints     No significant pain or limitation of range of motion.     FOLLOWUP:      Future Appointments   Date Time Provider Department Center   10/5/2021 10:00 AM Pat Burciaga PT [facility]   10/7/2021 12:30 PM Pat Burciaga [facility]   10/12/2021  1:10 PM Pat Burci[facility]   10/14/2021  9:30 AM [facility]   10/15/2021  1:10 PM Pat Burciaga PT [facility]   11/11/2021  9:30 AM Dominic Weiss MD [facility]       Orthopaedic Surgery appointments are at the [facility] [place] ([address] [place] [address]). Call [phone] to schedule a follow-up appointment at this location with your provider.     PLANNED DISCHARGE ORDERS:      Discharge Medication List as of 9/30/2021 10:46 AM      START taking these medications    Details   acetaminophen (TYLENOL) 325 MG tablet Take 2 tablets (650 mg) by mouth every 4 hours as needed for other, Disp-60 tablet, R-0, E-PrescribeDischarge today      aspirin (ASA) 81 MG EC tablet Take 2 tablets (162 mg) by mouth daily, Disp-60 tablet, R-0, E-Prescribe      oxyCODONE (ROXICODONE) 5 MG tablet Take 1 tablet (5 mg) by mouth every 4 hours as needed, Disp-40 tablet, R-0, E-Prescribe      polyethylene glycol (MIRALAX) 17 g packet Take 17 g by mouth daily, Disp-7 packet, R-0, E-Prescribe      senna-docusate (SENOKOT-S/PERICOLACE) 8.6-50 MG tablet Take 1 tablet by mouth 2 times daily, Disp-30 tablet, R-0, E-Prescribe         CONTINUE these medications which have NOT CHANGED    Details   albuterol (PROAIR HFA/PROVENTIL HFA/VENTOLIN HFA) 108 (90 Base) MCG/ACT inhaler Inhale 2 puffs into the lungs every 6 hours as needed , Historical      cholecalciferol 50 MCG (2000 UT) tablet Take 2,000 Units by mouth , Historical     "  escitalopram (LEXAPRO) 10 MG tablet Take 10 mg by mouth every morning , Historical      Omega-3 Fatty Acids (FISH OIL) 1200 MG capsule Historical      propylene glycol (SYSTANE BALANCE) 0.6 % SOLN ophthalmic solution Historical      zolpidem (AMBIEN) 10 MG tablet Take 10 mg by mouth nightly as needed , Historical      Calcium-Vitamin D-Vitamin K (CALCIUM + D) 500-1000-40 MG-UNT-MCG CHEW CALCIUM + D 500-1000-40 MG-UNT-MCG CHEW, Historical      magnesium 250 MG tablet Take 250 mg by mouth, Historical      Multiple Vitamins-Minerals (MULTI FOR HER 50+) TABS Historical         STOP taking these medications       hydrochlorothiazide (MICROZIDE) 12.5 MG capsule Comments:   Reason for Stopping:         losartan (COZAAR) 100 MG tablet Comments:   Reason for Stopping:                 Discharge Procedure Orders   Physical Therapy Referral   Referral Priority: Routine Referral Type: Rehab Therapy Physical Therapy   Number of Visits Requested: 1     Reason for your hospital stay   Order Comments: You stayed in the hospital overnight following your surgery     Contact Surgeon Team   Order Comments: You may experience symptoms that require follow-up before your scheduled appointment. Refer to the \"[employer]\" for instructions on when to contact Dr. Weiss's office if you are concerned about pain control, blood clots, constipation, or if you are unable to urinate.    Regular business hours (Monday - Friday, 8am - 5pm):  [facility]: [phone]  [facility]: [phone]  [facility]: [phone]    After hours and weekends:  [facility] on call Orthopedic resident: [phone]     Orthopedic Urgent Care   Order Comments: If you are not able to reach Dr. Weiss's office and you need immediate care, go to the Orthopedic Urgent Care at your Surgeon's office.  Do NOT go to the Emergency Room unless you have shortness " of breath, chest pain, or other signs of a medical emergency.     Call 911   Order Comments: Call 911 immediately if you experience sudden-onset chest pain, arm weakness/numbness, slurred speech, or shortness of breath     Breathing exercises   Order Comments: Perform breathing exercises using your Incentive Spirometer 10 times per hour while awake for 2 weeks.     Fever Management   Order Comments: A low grade fever can be expected after surgery.  Use acetaminophen (TYLENOL) as needed for fever management.  Contact your Surgeon Team if you have a fever greater than 101.5 F, chills, and/or night sweats.     Constipation management   Order Comments: Constipation (hard, dry bowel movements) is expected after surgery due to the combination of being less active, the anesthetic, and the opioid pain medication.  You can do the following to help reduce constipation:  ~  FLUIDS:  Drink clear liquids (water or Gatorade), or juice (apple/prune).  ~  DIET:  Eat a fiber rich diet.    ~  ACTIVITY:  Get up and move around several times a day.  Increase your activity as you are able.  MEDICATIONS:  Reduce the risk of constipation by starting medications before you are constipated.  You can take Miralax   (1 packet as directed) and/or a stool softener (Senokot 1-2 tablets 1-2 times a day).  If you already have constipation and these medications are not working, you can get magnesium citrate and use as directed.  If you continue to have constipation you can try an over the counter suppository or enema.  Call your Surgeon Team if it has been greater than 3 days since your last bowel movement.     Reduced Urine Output   Order Comments: Changes in the amount of fluids you drank before and after surgery may result in problems urinating.  It is important to stay well-hydrated after surgery and drink plenty of water. If it has been greater than 8 hours since you have urinated despite drinking plenty of water, call your Surgeon Team.      Anticoagulation - aspirin   Order Comments: Take the aspirin as prescribed for a total of four weeks after surgery.  This is given to help minimize your risk of blood clot.     Activity - Exercises to prevent blood clots   Order Comments: Unless otherwise directed by your Surgeon team, perform the following exercises at least three times per day for the first four weeks after surgery to prevent blood clots in your legs: 1) Point and flex your feet (Ankle Pumps), 2) Move your ankle around in big circles, 3) Wiggle your toes, 4) Walk, even for short distances, several times a day, will help decrease the risk of blood clots.     Order Specific Question Answer Comments   Is discharge order? Yes      Pain after Surgery   Order Comments: Pain after surgery is normal and expected.  You will have some amount of pain for several weeks after surgery.  Your pain will improve with time.  There are several things you can do to help reduce your pain including: rest, ice, elevation, and using pain medications as needed. Contact your Surgeon Team if you have pain that persists or worsens after surgery despite rest, ice, elevation, and taking your medication(s) as prescribed. Contact your Surgeon Team if you have new numbness, tingling, or weakness in your operative extremity.     Swelling after Surgery   Order Comments: Swelling and/or bruising of the surgical extremity is common and may persist for several months after surgery. In addition to frequent icing and elevation, gentle compressive support with an ACE wrap or tubigrip may help with swelling. Apply compression regularly, removing at least twice daily to perform skin checks. Contact your Surgeon Team if your swelling increases and is NOT associated with an increase in your activity level, or if your swelling increases and is associated with redness and pain.     LOWER Extremity Elevation   Order Comments: Swelling is expected for several months after surgery. This type  "of swelling is usually associated with gravity and activity, and can be improved with elevation.   The best way to do this is to get your \"toes above your nose\" by laying down and placing several pillows lengthwise under your calf and heel. When elevating your leg keep your knee completely straight. Perform this elevation as often as possible especially for the first two weeks after surgery.     Cold therapy   Order Comments: Ice can be used to control swelling and discomfort after surgery. Place a thin towel over your operative site and apply the ice pack overtop. Leave ice pack in place for 20 minutes, then remove for 20 minutes. Repeat this 20 minutes on/20 minutes off routine as often as tolerated.     acetaminophen (TYLENOL) Instructions   Order Comments: You were discharged with acetaminophen (TYLENOL) for pain management after surgery. Acetaminophen most effectively manages pain symptoms when it is taken on a schedule without missing doses (every four, six, or eight hours). Your Provider will prescribe a safe daily dose between 3000 - 4000 mg.  Do NOT exceed this daily dose. Most patients use acetaminophen for pain control for the first four weeks after surgery.  You can wean from this medication as your pain decreases.     NSAID Instructions   Order Comments: You were discharged with an anti-inflammatory medication for pain management to use in combination with acetaminophen (TYLENOL) and the narcotic pain medication.  Take this medication exactly as directed.  You should only take one anti-inflammatory at a time.  Some common anti-inflammatories include: ibuprofen (ADVIL, MOTRIN), naproxen (ALEVE, NAPROSYN), celecoxib (CELEBREX), meloxicam (MOBIC), ketorolac (TORADOL).  Take this medication with food and water.     Opioids - Tapering Instructions   Order Comments: In the first three days following surgery, your symptoms may warrant use of the narcotic pain medication every four to six hours as prescribed. " This is normal. As your pain symptoms improve, focus your efforts on decreasing (tapering) use of narcotic medications. The most successful tapering strategy is to first, decrease the number of tablets you take every 4-6 hours to the minimum prescribed. Then, increase the amount of time between doses.  For example:  First, taper to   or 1 tablet every 4-6 hours.  Then, taper to   or 1 tablet every 6-8 hours.  Then, taper to   or 1 tablet every 8-10 hours.  Then, taper to   or 1 tablet every 10-12 hours.  Then, taper to   or 1 tablet at bedtime.  The bedtime dose can help with comfort during sleep and is typically the last dose to be discontinued after surgery.     Follow Up Care   Order Comments: You are scheduled for a post operative wound check with Dr. Weiss's clinic approximately two weeks after surgery. At approximately six weeks after surgery, you will see Dr. Weiss in clinic. During this visit, repeat X rays of your operative hip will be performed.      Your follow up appointments will be at the location that you regularly see Dr. Weiss:    [facility]  [address] [place] [address] [phone]    [address] [address] [phone]    [facility]  [address] [place] [address] [phone]     Activity - Total Knee Arthroplasty     Order Specific Question Answer Comments   Is discharge order? Yes      Return to Driving   Order Comments: Return to driving - Driving is NOT permitted until directed by your provider. Under no circumstance are you permitted to drive while using narcotic pain medications.     Order Specific Question Answer Comments   Is discharge order? Yes      Dressing / Wound Care - Wound   Order Comments: You have a clean dressing on your surgical wound. Dressing change instructions as follows: remove your dressing in 7  "days, and leave incision open to air. Contact your Surgeon Team if you have increased redness, warmth around the surgical wound, and/or drainage from the surgical wound.     Dressing / Wound Care - NO Tub Bathing   Order Comments: Tub bathing, swimming, or any other activities that will cause your incision to be submerged in water should be avoided until allowed by your Surgeon.     Activity - Total Hip Arthroplasty   Order Comments: Refer to the [facility] \"Your Guide to Total Joint Replacement\" for recommendations on activities and Exercises.     Order Specific Question Answer Comments   Is discharge order? Yes      Return to Driving   Order Comments: Return to driving - Driving is NOT permitted until directed by your provider. Under no circumstance are you permitted to drive while using narcotic pain medications.     Order Specific Question Answer Comments   Is discharge order? Yes      Dressing / Wound Care - Wound   Order Comments: You have a clean dressing on your surgical wound. Dressing change instructions as follows: remove your dressing in 7 days, and leave incision open to air. Contact your Surgeon Team if you have increased redness, warmth around the surgical wound, and/or drainage from the surgical wound.     Dressing / Wound Care - NO Tub Bathing   Order Comments: Tub bathing, swimming, or any other activities that will cause your incision to be submerged in water should be avoided until allowed by your Surgeon.     Discharge Instructions   Order Comments: OTAL HIP ARTHROPLASTY POST OPERATIVE DISCHARGE INSTRUCTIONS    FOLLOW UP APPOINTMENT  You are scheduled for a post operative wound check with Dr. Weiss's clinic approximately two weeks after surgery. At approximately six weeks after surgery, you will see Dr. Weiss in clinic. During this visit, repeat X rays of your operative hip will be performed.      Your follow up appointments will be at the location that you regularly see Dr." Abhishek:    [facility]  [address] [place] [address]  [phone]    [facility]  [id number] [place] [address] [phone]    [facility]  [address] [place] [address]  [phone]    Physical therapy:   A referral for physical therapy will be provided at the time of discharge.       ACTIVITY  Weight bearing status:   You may bear weight on your operative extremity as tolerated, using assistive devices (walker, cane) as needed. As you begin to feel more comfortable ambulating, you may gradually transition from a walker to a cane. Eventually, you should wean from all assistive devices. Although we would like you to discontinue use of assistive devices as soon as possible, do not transition until you have worked with your physical therapist to achieve safe balance and comfort.     Posterior hip precautions:  You must maintain the following posterior hip precautions for the next 12 weeks with no exceptions:  1) no hip flexion >90 degrees (no bending down at the waist)  2) no internal rotation past neutral (no pivoting)  3) no adduction past midline (no crossing your legs)    Exercises:   Perform the following exercises at least three times per day for the first four weeks after surgery to prevent complications, such as blood clots in your legs:  1) Point and flex your feet  2) Move your ankle around in big circles  3) Wiggle your toes   Also, perform thigh muscle tightening exercises for 10 to 15 minutes at least three times per day for the first four weeks after surgery.    Athletic activities:  Activities such as swimming, bicycling, jogging, running, and stop-and-go sports should be avoided until directed by your provider.    Driving:  Driving is not permitted until directed by your provider. Under no circumstance are you permitted to drive while using narcotic  pain medications.    Return to work:  You may return to work when directed by your provider.       COMFORT AND PAIN MANAGEMENT  Icing:  An ice pack will be provided to control swelling and discomfort after surgery. Place a thin towel on your skin and apply the ice pack overtop. You may apply ice for 20 minutes as often as two times per hour.    Pain medications & tapering:  You will be discharged with acetaminophen (Tylenol) and a narcotic medication for pain management after surgery. Acetaminophen is most effective when it is taken per the schedule outlined by your provider (every four, six, or eight hours as prescribed). You may safely use acetaminophen as prescribed for the first four weeks after surgery provided you do not exceed the maximum daily dose prescribed by your provider (usually 3000 mg - 4000 mg). The narcotic pain medication should only be taken on an as-needed basis when necessary and should be reserved for severe pain that is not controlled with scheduled acetaminophen. In the first three days following surgery, your symptoms may warrant use of the narcotic pain medication every three, four, or six hours as prescribed. After three days, focus your efforts on decreasing (tapering) use of narcotic medications.   The most successful tapering strategy is to first, decrease the dose (number of tablets) and second, increase the interval (time between doses). For example, if you begin taking two tablets every four hours after surgery, start your taper by decreasing one of these doses to one tablet. Every one to two days, decrease another dose to one tablet until you are eventually taking one tablet every four hours. Once this is achieved, focus on increasing the number of hours between doses, moving from one tablet every four hours to one tablet every six hours. As tolerated, continue to increase the interval to eight and twelve hours. Eventually, taper to one dose every evening and discontinue when no  longer needed.      ANTICOAGULATION  Take aspirin 162mg daily for the first four weeks after surgery.       WOUND CARE AND SHOWERING  Wound care:  You have a clean Aquacel dressing on your surgical wound. This dressing should stay in place for seven days to allow the incision to heal. If the Aquacel dressing becomes excessively wet or saturated before removal on day seven, please contact Dr. Weiss's office. After seven days, remove the dressing and leave the wound open to air (see showering instructions below). If there is any drainage from the incision after removal of the Aquacel, dress the wound with sterile 4x4 gauze, using tape over top to secure the dressing in place over the incision. Please contact Dr. Weiss's office if you notice the following after removal of the Aquacel dressin) significant cloudy, bloody, or malodorous drainage from the incision, 2) excessive warmth and redness around the incision.    Showering:  You may shower immediately after surgery provided the Aquacel dressing is in place. Although you are strictly prohibited from soaking or submerging the surgical wound underwater, you may shower in the usual fashion, allowing water and soap to run over the Aquacel. Once the Aquacel is removed on the seventh day after surgery, you may continue to shower; however, the incision should be covered with saran wrap (or any other non-permeable covering) to allow the incision to remain dry and to prevent you from scrubbing/rubbing the incision. The steri-strips (small white tape that is directly on the incision areas) should be left on until they fall off or are removed at your first office visit. After your incision is examined at your first office visit, you will likely be allowed to return to showering without covering the incision.     Tub bathing:  Tub bathing, swimming, or any other activities that cause your incision to be submerged should be avoided until allowed by your provider. Typically,  patients are allowed to return to these activities six weeks after surgery pending clearance by your provider.      CONTACTING YOUR PROVIDER:  You may experience symptoms that require follow-up before your scheduled two week appointment. Please contact Dr. Weiss's office if you experience:  1) Pain that persists or worsens in the first few days after surgery  2) Excessive redness or drainage of cloudy or bloody material from the wounds (clear red tinted fluid and some mild drainage should be expected) or drainage of any kind five days after surgery  3) A temperature elevation greater than 101.5    4) Pain, swelling or redness in your calf  5) Numbness or weakness in your leg or foot      Regular business hours (Monday - Friday, 8am - 5pm):  [facility]: [phone]  [facility]: [phone]  [facility]: [phone]    After hours and weekends:  [facility] on call Orthopedic resident: [phone]     Opioid Instructions (Greater than or equal to 65 years)   Order Comments: You were discharged with an opioid medication (hydromorphone, oxycodone, hydrocodone, or tramadol). This medication should only be taken for breakthrough pain that is not controlled with acetaminophen (TYLENOL). If you rate your pain less than 3 you do not need this medication.  Pain rating 0-3:  You do not need this medication  Pain rating 4-6:  Take 1/2 tablet every 4-6 hours as needed  Pain rating 7-10:  Take 1 tablet every 4-6 hours as needed  Do not exceed 4 tablets per day     Weight bearing as tolerated   Order Comments: Weight bearing as tolerated on your operative extremity.     Order Specific Question Answer Comments   Is discharge order? Yes      Shower with wound/dressing covered   Order Comments: You must COVER your dressing or incision with saran wrap (or any other non-permeable covering) to allow the incision to remain  dry while showering.  You may shower 2   days after surgery as long as the surgical wound stays dry. Continue to cover your dressing or incision for showering until your first office visit.  You are strictly prohibited from soaking   or submerging the surgical wound underwater.     No flexion past 90 degrees   Order Comments: No bending at waist past 90 degrees.     Order Specific Question Answer Comments   Is discharge order? Yes      No internal rotation   Order Comments: No pivoting on your operative leg.     Order Specific Question Answer Comments   Is discharge order? Yes      No adduction past midline   Order Comments: No crossing your operative leg.     Order Specific Question Answer Comments   Is discharge order? Yes      Weight bearing as tolerated   Order Comments: Weight bearing as tolerated on your operative extremity.     Order Specific Question Answer Comments   Is discharge order? Yes      Shower with wound/dressing covered   Order Comments: You must COVER your dressing or incision with saran wrap (or any other non-permeable covering) to allow the incision to remain dry while showering.  You may shower 2   days after surgery as long as the surgical wound stays dry. Continue to cover your dressing or incision for showering until your first office visit.  You are strictly prohibited from soaking   or submerging the surgical wound underwater.     Crutches DME   Order Comments: DME Documentation: Describe the reason for need to support medical necessity: Impaired gait status post hip surgery. I, the undersigned, certify that the above prescribed supplies are medically necessary for this patient and is both reasonable and necessary in reference to accepted standards of medical practice in the treatment of this patient's condition and is not prescribed as a convenience.     Order Specific Question Answer Comments   DME Provider: [facility]    Crutch Type: Standard    Crutches Add On: NA    Length of  Need: Lifetime      Cane DME   Order Comments: DME Documentation: Describe the reason for need to support medical necessity: Impaired gait status post hip surgery. I, the undersigned, certify that the above prescribed supplies are medically necessary for this patient and is both reasonable and necessary in reference to accepted standards of medical practice in the treatment of this patient's condition and is not prescribed as a convenience.     Order Specific Question Answer Comments   DME Provider: [facility]    Cane Type: Single Tip    Reminder: Patient can typically get 1 every 5 years      Walker DME   Order Comments: : DME Documentation: Describe the reason for need to support medical necessity: Impaired gait status post hip surgery. I, the undersigned, certify that the above prescribed supplies are medically necessary for this patient and is both reasonable and necessary in reference to accepted standards of medical practice in the treatment of this patient's condition and is not prescribed as a convenience.     Order Specific Question Answer Comments   DME Provider: [facility]    Walker Type: Standard (2 Wheel)    Accessories: N/A      Regular Diet Adult     Order Specific Question Answer Comments   Is discharge order? Yes      ABO/Rh Type and Screen   Standing Status: Future Standing Exp. Date: 10/27/21       Jayda Cardona MD  Orthopaedic Surgery Resident, PGY4

## 2021-10-05 ENCOUNTER — THERAPY VISIT (OUTPATIENT)
Dept: PHYSICAL THERAPY | Facility: CLINIC | Age: 71
End: 2021-10-05
Payer: MEDICARE

## 2021-10-05 DIAGNOSIS — Z98.890 POSTOPERATIVE STATE: ICD-10-CM

## 2021-10-05 DIAGNOSIS — M25.551 RIGHT HIP PAIN: ICD-10-CM

## 2021-10-05 DIAGNOSIS — M25.551 HIP PAIN, RIGHT: ICD-10-CM

## 2021-10-05 PROCEDURE — 97110 THERAPEUTIC EXERCISES: CPT | Mod: GP | Performed by: PHYSICAL THERAPIST

## 2021-10-05 PROCEDURE — 97530 THERAPEUTIC ACTIVITIES: CPT | Mod: GP | Performed by: PHYSICAL THERAPIST

## 2021-10-05 PROCEDURE — 97161 PT EVAL LOW COMPLEX 20 MIN: CPT | Mod: GP | Performed by: PHYSICAL THERAPIST

## 2021-10-05 NOTE — LETTER
DEPARTMENT OF HEALTH AND HUMAN SERVICES  CENTERS FOR MEDICARE & MEDICAID SERVICES    PLAN/UPDATED PLAN OF PROGRESS FOR OUTPATIENT REHABILITATION     PATIENTS NAME:  Dorene Ortega   : 1950  PROVIDER NUMBER:    [id number]  HICN:  [id number]  PROVIDER NAME: [facility]  MEDICAL RECORD NUMBER: [id number]   START OF CARE DATE:  SOC Date: 10/05/21   TYPE:  PT  PRIMARY/TREATMENT DIAGNOSIS: (Pertinent Medical Diagnosis)   Right hip pain  Hip pain, right  Postoperative state  VISITS FROM START OF CARE:   1     Physical Therapy Initial Evaluation  Subjective:  The history is provided by the patient.   Patient Health History  Pain is reported as 5/10 on pain scale.  General health as reported by patient is excellent.  Pertinent medical history includes: depression, overweight, high blood pressure, osteoporosis and other (anxiety).   Red flags:  None as reported by patient.  Medical allergies: none.   Surgeries include:  Orthopedic surgery. Other surgery history details: L KAREN, R TKA, L hip abductor tear.    Current medications:  Anti-depressants, high blood pressure medication and pain medication.    Current occupation is retired teacher.   Other job tasks: 2 young grandkids.                Therapist Generated HPI Evaluation  Problem details: Pain has been ongoing for >1 year. Pt is now s/p R KAREN, posterior approach, on 21.         Type of problem:  Right hip.  This is a new condition.  Condition occurred with:  Degenerative joint disease.  Where condition occurred: for unknown reasons.  Patient reports pain:  Anterior, groin and lateral.  Pain is described as aching and is intermittent.  Pain radiates to:  Gluteals. Pain timing: no pattern.  Since onset symptoms are gradually improving.  Associated symptoms:  Loss of motion/stiffness, edema and loss of strength. Symptoms are exacerbated by ascending stairs, bending/squatting, descending stairs, sitting, transfers, standing  and walking  and relieved by activity/movement, ice and analgesics.  Barriers include:  None as reported by patient.  Objective:  Gait:    Gait Type:  Antalgic   Weight Bearing Status:  WBAT   Assistive Devices:  Walker  Deviations:  General Deviations:  Patricia decr, stance time decr and stride length decr  PATIENTS NAME:  Dorene Ortega   : 1950     Lumbar/SI Evaluation  ROM:  AROM Lumbar: normal (within precautions)  Hip Evaluation  HIP AROM:  AROM:    Left Hip:     Normal    Right Hip:   Normal (within precautions)  Hip Strength:  Flexion:   Left: 5/5   Pain:  Right: 3/5   Pain:  Hip Palpation:  Right hip tenderness present at:  Piriformis and Gluteus Medius  Functional Testing:    Quad:  Bilateral leg squat:  Excessive anterior knee excursion and mild loss of control       Knee Evaluation:  ROM:  AROM: normal    Strength:   Quad Set Left: WNL    Pain:   Quad Set Right: Good    Pain:    Assessment/Plan:    Patient is a 71 year old female with right side hip complaints.    Patient has the following significant findings with corresponding treatment plan.                Diagnosis 1:  S/p R KAREN, with posterior hip precautions    Pain -  hot/cold therapy, manual therapy, self management, education and home program  Decreased ROM/flexibility - manual therapy, therapeutic exercise and home program  Decreased strength - therapeutic exercise, therapeutic activities and home program    Therapy Evaluation Codes:   1) History comprised of:   Personal factors that impact the plan of care:      None.    Comorbidity factors that impact the plan of care are:      None.     Medications impacting care: None.  2) Examination of Body Systems comprised of:   Body structures and functions that impact the plan of care:      Hip, Knee and Lumbar spine.   Activity limitations that impact the plan of care are:      Bathing, Bending, Cooking, Driving, Dressing, Reading/Computer work,   Sitting, Squatting/kneeling, Stairs, Standing,  "Walking and Sleeping.  3) Clinical presentation characteristics are:   Stable/Uncomplicated.  4) Decision-Making    Low complexity using standardized patient assessment instrument and/or   measureable assessment of functional outcome.  Cumulative Therapy Evaluation is: Low complexity.  Previous and current functional limitations:  (See Goal Flow Sheet for this information)    Short term and Long term goals: (See Goal Flow Sheet for this information)   Communication ability:  Patient appears to be able to clearly communicate and understand verbal and written communication and follow directions correctly.  Treatment Explanation - The following has been discussed with the patient:   RX ordered/plan of care, Anticipated outcomes, Possible risks and side effects      PATIENTS NAME:  Dorene Ortega   : 1950      This patient would benefit from PT intervention to resume normal activities.   Rehab potential is good.  Frequency:  2 X week, once daily  Duration:  for 6 weeks  Discharge Plan:  Achieve all LTG.  Independent in home treatment program.  Reach maximal therapeutic benefit.              Caregiver Signature/Credentials _____________________________ Date ________         Pat Burciaga PT     I have reviewed and certified the need for these services and plan of treatment while under my care.        PHYSICIAN'S SIGNATURE:   _________________________________________      Date___________   Dominic Weiss MD    Certification period:  Beginning of Cert date period: 10/05/21 to  22     Functional Level Progress Report: Please see attached \"Goal Flow sheet for Functional level.\"    ____X____ Continue Services or       ________ DC Services                Service dates: From  SOC Date: 10/05/21 to present                         "

## 2021-10-07 ENCOUNTER — PATIENT OUTREACH (OUTPATIENT)
Dept: ORTHOPEDICS | Facility: CLINIC | Age: 71
End: 2021-10-07

## 2021-10-07 ENCOUNTER — THERAPY VISIT (OUTPATIENT)
Dept: PHYSICAL THERAPY | Facility: CLINIC | Age: 71
End: 2021-10-07
Payer: MEDICARE

## 2021-10-07 DIAGNOSIS — Z98.890 POSTOPERATIVE STATE: ICD-10-CM

## 2021-10-07 DIAGNOSIS — M25.551 HIP PAIN, RIGHT: ICD-10-CM

## 2021-10-07 PROCEDURE — 97116 GAIT TRAINING THERAPY: CPT | Mod: GP | Performed by: PHYSICAL THERAPIST

## 2021-10-07 PROCEDURE — 97110 THERAPEUTIC EXERCISES: CPT | Mod: GP | Performed by: PHYSICAL THERAPIST

## 2021-10-07 NOTE — TELEPHONE ENCOUNTER
DOS: 9/29/21 Right total hip arthroplasty    Reached out to patient to check in following her surgery on 9/29. Patient states she is doing well. She has been off Oxycodone since Tuesday and managing pain well with 2 -325 mg Tylenol every 4 hours. She states now that she is off the oxycodone, she has resumed her Ambien and has slept well the last 2 nights. She is icing and just bought extra ice bags. She showered by herself yesterday. She has done 2 PT sessions so far and is doing well. She is concerned about the swelling but did get a compression stocking. She will also add her hydrochlorothiazide back in but continues to hold her Losartan until her Systolic BP is above 140. She will elevate to help with swelling as well. Understanding expressed. Patient has her 2 week postop visit next Thursday 10/14. She will reach out via [facility] if she has any further questions before her appointment.

## 2021-10-12 ENCOUNTER — THERAPY VISIT (OUTPATIENT)
Dept: PHYSICAL THERAPY | Facility: CLINIC | Age: 71
End: 2021-10-12
Payer: MEDICARE

## 2021-10-12 DIAGNOSIS — Z98.890 POSTOPERATIVE STATE: ICD-10-CM

## 2021-10-12 DIAGNOSIS — M25.551 HIP PAIN, RIGHT: ICD-10-CM

## 2021-10-12 PROCEDURE — 97112 NEUROMUSCULAR REEDUCATION: CPT | Mod: GP | Performed by: PHYSICAL THERAPIST

## 2021-10-12 PROCEDURE — 97110 THERAPEUTIC EXERCISES: CPT | Mod: GP | Performed by: PHYSICAL THERAPIST

## 2021-10-12 NOTE — PROGRESS NOTES
Subjective:  HPI  Physical Exam                    Objective:  System    Physical Exam    General     ROS    Assessment/Plan:    PROGRESS  REPORT    Progress reporting period is from 10/5/21 to 10/12/21. Pt has attended 3 PT visits.    SUBJECTIVE  Pt states that things are going well. She has been walking more, and is now using her SEC for ambulation. She is doing her exercises and icing daily.      Initial Pain level: 4/10       OBJECTIVE  Pt ambulates into clinic with SEC, decreased stance time on R and decreased shola  Good quad set. Unable to perform supine SLR with good form  Independent in HEP  Independently adheres to posterior hip precautions without cues      ASSESSMENT/PLAN  Diagnosis 1:  S/p R KAREN, with posterior hip precautions    Pain -  hot/cold therapy, manual therapy, self management, education and home program  Decreased ROM/flexibility - manual therapy, therapeutic exercise and home program  Decreased strength - therapeutic exercise, therapeutic activities and home program  STG/LTGs have been met or progress has been made towards goals:  Yes (See Goal flow sheet completed today.)  Assessment of Progress: The patient's condition is improving.  The patient's condition has potential to improve.  Self Management Plans:  Patient has been instructed in a home treatment program.  Patient is independent in a home treatment program.  Patient  has been instructed in self management of symptoms.  Patient is independent in self management of symptoms.  I have re-evaluated this patient and find that the nature, scope, duration and intensity of the therapy is appropriate for the medical condition of the patient.  Dorene continues to require the following intervention to meet STG and LTG's: PT  The patient is returning to your office for a recheck appointment.    Recommendations:  This patient would benefit from continued therapy.     Frequency:  1-2 X week, once daily  Duration:  for 2 weeks - pt is flying back  home to [place] next week and will continue her therapy there      Please refer to the daily flowsheet for treatment today, total treatment time and time spent performing 1:1 timed codes.

## 2021-10-14 ENCOUNTER — OFFICE VISIT (OUTPATIENT)
Dept: ORTHOPEDICS | Facility: CLINIC | Age: 71
End: 2021-10-14
Payer: MEDICARE

## 2021-10-14 DIAGNOSIS — Z47.89 ORTHOPEDIC AFTERCARE: Primary | ICD-10-CM

## 2021-10-14 PROCEDURE — 99024 POSTOP FOLLOW-UP VISIT: CPT | Mod: GC | Performed by: ORTHOPAEDIC SURGERY

## 2021-10-14 NOTE — LETTER
Date:October 17, 2021      Provider requested that no letter be sent. Do not send.       [facility]

## 2021-10-14 NOTE — PROGRESS NOTES
Chief Complaint: RECHECK (2 week POP Right KAREN DOS 9/29/21)       HPI: Dorene Ortega is a 71-year-old female who is 2 weeks status post right total hip arthroplasty on 9/29/2021 with Dr. Weiss.  She presents today for follow-up.  At today's visit, the patient is doing well.  Her pain has been improving, and she has only been requiring Tylenol as needed.  She is no longer requiring a walker and has been using a cane for ambulation.  She did not use assistive devices for ambulation prior to surgery. She is no longer requiring her shower stool and has been able to  the shower.  She has been working with physical therapy and this has been going well.  She has been taking her aspirin as prescribed for DVT prophylaxis.  She denies numbness and tingling.  She denies fevers or chills.  She denies drainage from her incision site.  She does not have any concerns at today's visit.    Medications and allergies are documented in the EMR and have been reviewed.    Current Outpatient Medications:      acetaminophen (TYLENOL) 325 MG tablet, Take 2 tablets (650 mg) by mouth every 4 hours as needed for other, Disp: 60 tablet, Rfl: 0     albuterol (PROAIR HFA/PROVENTIL HFA/VENTOLIN HFA) 108 (90 Base) MCG/ACT inhaler, Inhale 2 puffs into the lungs every 6 hours as needed , Disp: , Rfl:      aspirin (ASA) 81 MG EC tablet, Take 2 tablets (162 mg) by mouth daily, Disp: 60 tablet, Rfl: 0     Calcium-Vitamin D-Vitamin K (CALCIUM + D) 500-1000-40 MG-UNT-MCG CHEW, CALCIUM + D 500-1000-40 MG-UNT-MCG CHEW (Patient not taking: Reported on 9/27/2021), Disp: , Rfl:      cholecalciferol 50 MCG (2000 UT) tablet, Take 2,000 Units by mouth , Disp: , Rfl:      escitalopram (LEXAPRO) 10 MG tablet, Take 10 mg by mouth every morning , Disp: , Rfl:      magnesium 250 MG tablet, Take 250 mg by mouth (Patient not taking: Reported on 9/27/2021), Disp: , Rfl:      Multiple Vitamins-Minerals (MULTI FOR HER 50+) TABS, , Disp: , Rfl:      Omega-3 Fatty  Acids (FISH OIL) 1200 MG capsule, , Disp: , Rfl:      oxyCODONE (ROXICODONE) 5 MG tablet, Take 1 tablet (5 mg) by mouth every 4 hours as needed (Patient not taking: Reported on 10/14/2021), Disp: 40 tablet, Rfl: 0     polyethylene glycol (MIRALAX) 17 g packet, Take 17 g by mouth daily, Disp: 7 packet, Rfl: 0     propylene glycol (SYSTANE BALANCE) 0.6 % SOLN ophthalmic solution, , Disp: , Rfl:      senna-docusate (SENOKOT-S/PERICOLACE) 8.6-50 MG tablet, Take 1 tablet by mouth 2 times daily (Patient not taking: Reported on 10/14/2021), Disp: 30 tablet, Rfl: 0     zolpidem (AMBIEN) 10 MG tablet, Take 10 mg by mouth nightly as needed , Disp: , Rfl:   Allergies: Penicillins, Quinolones, and Sulfa drugs    Physical Exam:  On physical examination the patient appears the stated age, is in no acute distress, affect is appropriate, and breathing is non-labored.  There were no vitals taken for this visit.  There is no height or weight on file to calculate BMI.  Gait: Mildly antalgic  Incision: Steri-Strips in place with dried serosanguineous drainage, no active drainage or surrounding erythema  RLE: Grossly distally neurovascularly intact.    X-rays:    No new x-rays obtained at today's visit.    Assessment:   71-year-old female who is 2 weeks status post right total hip arthroplasty on 9/29/2021 with Dr. Weiss.  Doing well.    Plan:   - Activity: weightbearing as tolerated, continue posterior hip precautions  - Continue using cane for balance  - Continue PT  - Recommend acetaminophen as needed for pain  - Continue aspirin as prescribed for DVT prophylaxis (aspirin 162mg every day for a total of 4 weeks)  - Follow up in 1 week via a video visit, follow-up in 6 weeks either in person or via a video visit    Patient seen and discussed with Dr. Weiss.    Jayda Cardona MD  Orthopaedic Surgery PGY-4

## 2021-10-14 NOTE — LETTER
10/14/2021         RE: Dorene Ortega  [id number]  [address]  [facility] [address]        Dear Colleague,    Thank you for referring your patient, Dorene Ortega, to the [facility] [place]. Please see a copy of my visit note below.    Patient presents today for a 2 week POP appointment after a right total hip arthroplasty DOS 9/29/21. Patient stated that she is doing very well and she has started PT and feels like she is making good progress. She I not taking her post op pain meds anymore. She is currently taking tylenol for the pain as needed. The incision looks well healed and she does not report any fever, redness, warmth, or drainage from the incision.     Chief Complaint: RECHECK (2 week POP Right KAREN DOS 9/29/21)       HPI: Dorene Ortega is a 71-year-old female who is 2 weeks status post right total hip arthroplasty on 9/29/2021 with Dr. Weiss.  She presents today for follow-up.  At today's visit, the patient is doing well.  Her pain has been improving, and she has only been requiring Tylenol as needed.  She is no longer requiring a walker and has been using a cane for ambulation.  She did not use assistive devices for ambulation prior to surgery. She is no longer requiring her shower stool and has been able to  the shower.  She has been working with physical therapy and this has been going well.  She has been taking her aspirin as prescribed for DVT prophylaxis.  She denies numbness and tingling.  She denies fevers or chills.  She denies drainage from her incision site.  She does not have any concerns at today's visit.    Medications and allergies are documented in the EMR and have been reviewed.    Current Outpatient Medications:      acetaminophen (TYLENOL) 325 MG tablet, Take 2 tablets (650 mg) by mouth every 4 hours as needed for other, Disp: 60 tablet, Rfl: 0     albuterol (PROAIR HFA/PROVENTIL HFA/VENTOLIN HFA) 108 (90 Base) MCG/ACT inhaler, Inhale 2 puffs into  the lungs every 6 hours as needed , Disp: , Rfl:      aspirin (ASA) 81 MG EC tablet, Take 2 tablets (162 mg) by mouth daily, Disp: 60 tablet, Rfl: 0     Calcium-Vitamin D-Vitamin K (CALCIUM + D) 500-1000-40 MG-UNT-MCG CHEW, CALCIUM + D 500-1000-40 MG-UNT-MCG CHEW (Patient not taking: Reported on 9/27/2021), Disp: , Rfl:      cholecalciferol 50 MCG (2000 UT) tablet, Take 2,000 Units by mouth , Disp: , Rfl:      escitalopram (LEXAPRO) 10 MG tablet, Take 10 mg by mouth every morning , Disp: , Rfl:      magnesium 250 MG tablet, Take 250 mg by mouth (Patient not taking: Reported on 9/27/2021), Disp: , Rfl:      Multiple Vitamins-Minerals (MULTI FOR HER 50+) TABS, , Disp: , Rfl:      Omega-3 Fatty Acids (FISH OIL) 1200 MG capsule, , Disp: , Rfl:      oxyCODONE (ROXICODONE) 5 MG tablet, Take 1 tablet (5 mg) by mouth every 4 hours as needed (Patient not taking: Reported on 10/14/2021), Disp: 40 tablet, Rfl: 0     polyethylene glycol (MIRALAX) 17 g packet, Take 17 g by mouth daily, Disp: 7 packet, Rfl: 0     propylene glycol (SYSTANE BALANCE) 0.6 % SOLN ophthalmic solution, , Disp: , Rfl:      senna-docusate (SENOKOT-S/PERICOLACE) 8.6-50 MG tablet, Take 1 tablet by mouth 2 times daily (Patient not taking: Reported on 10/14/2021), Disp: 30 tablet, Rfl: 0     zolpidem (AMBIEN) 10 MG tablet, Take 10 mg by mouth nightly as needed , Disp: , Rfl:   Allergies: Penicillins, Quinolones, and Sulfa drugs    Physical Exam:  On physical examination the patient appears the stated age, is in no acute distress, affect is appropriate, and breathing is non-labored.  There were no vitals taken for this visit.  There is no height or weight on file to calculate BMI.  Gait: Mildly antalgic  Incision: Steri-Strips in place with dried serosanguineous drainage, no active drainage or surrounding erythema  RLE: Grossly distally neurovascularly intact.    X-rays:    No new x-rays obtained at today's visit.    Assessment:   71-year-old female who is 2  weeks status post right total hip arthroplasty on 9/29/2021 with Dr. Weiss.  Doing well.    Plan:   - Activity: weightbearing as tolerated, continue posterior hip precautions  - Continue using cane for balance  - Continue PT  - Recommend acetaminophen as needed for pain  - Continue aspirin as prescribed for DVT prophylaxis (aspirin 162mg every day for a total of 4 weeks)  - Follow up in 1 week via a video visit, follow-up in 6 weeks either in person or via a video visit    Patient seen and discussed with Dr. Weiss.    Jayda Cardona MD  Orthopaedic Surgery PGY-4      I have personally examined this patient and have reviewed the clinical presentation and progress note with the resident. I agree with the treatment plan as outlined. The plan was formulated with the resident on the day of the resident's dictation.          Again, thank you for allowing me to participate in the care of your patient.        Sincerely,        Dominic Weiss MD

## 2021-10-14 NOTE — PROGRESS NOTES
Patient presents today for a 2 week POP appointment after a right total hip arthroplasty DOS 9/29/21. Patient stated that she is doing very well and she has started PT and feels like she is making good progress. She I not taking her post op pain meds anymore. She is currently taking tylenol for the pain as needed. The incision looks well healed and she does not report any fever, redness, warmth, or drainage from the incision.

## 2021-10-15 ENCOUNTER — THERAPY VISIT (OUTPATIENT)
Dept: PHYSICAL THERAPY | Facility: CLINIC | Age: 71
End: 2021-10-15
Payer: MEDICARE

## 2021-10-15 DIAGNOSIS — M25.551 HIP PAIN, RIGHT: ICD-10-CM

## 2021-10-15 DIAGNOSIS — Z98.890 POSTOPERATIVE STATE: ICD-10-CM

## 2021-10-15 PROCEDURE — 97112 NEUROMUSCULAR REEDUCATION: CPT | Mod: GP | Performed by: PHYSICAL THERAPIST

## 2021-10-15 PROCEDURE — 97110 THERAPEUTIC EXERCISES: CPT | Mod: GP | Performed by: PHYSICAL THERAPIST

## 2021-10-17 ENCOUNTER — HEALTH MAINTENANCE LETTER (OUTPATIENT)
Age: 71
End: 2021-10-17

## 2021-10-19 ENCOUNTER — THERAPY VISIT (OUTPATIENT)
Dept: PHYSICAL THERAPY | Facility: CLINIC | Age: 71
End: 2021-10-19
Payer: MEDICARE

## 2021-10-19 DIAGNOSIS — Z98.890 POSTOPERATIVE STATE: ICD-10-CM

## 2021-10-19 DIAGNOSIS — M25.551 HIP PAIN, RIGHT: ICD-10-CM

## 2021-10-19 PROCEDURE — 97110 THERAPEUTIC EXERCISES: CPT | Mod: GP | Performed by: PHYSICAL THERAPIST

## 2021-10-19 PROCEDURE — 97112 NEUROMUSCULAR REEDUCATION: CPT | Mod: GP | Performed by: PHYSICAL THERAPIST

## 2021-10-19 NOTE — PROGRESS NOTES
Subjective:  HPI  Physical Exam                    Objective:  System    Physical Exam    General     ROS    Assessment/Plan:    DISCHARGE REPORT    Progress reporting period is from 10/12/21 to 10/19/21.     SUBJECTIVE  Fatimah is doing very well 2 weeks s/p R KAREN. She is leaving to fly home to [place] tomorrow, and will be continuing her care with Geronimo, her physical therapist back home. She is able to perform her ADLs, and her exercises and walking are going well. She will get a wheelchair at the airport and will get up periodically during the flight.     Current Pain level: 0/10   Initial Pain level: 4/10   Changes in function:  Yes, see goal flowsheet      OBJECTIVE  Independent in current HEP  Good R quad set, delayed  Ambulates with SEC and slight trendelenburg gait pattern  Independent in maintaining posterior hip precautions without cues    ASSESSMENT/PLAN  Diagnosis 1:  S/p R KAREN, with posterior hip precautions    Pain -  hot/cold therapy, manual therapy, self management, education and home program  Decreased ROM/flexibility - manual therapy, therapeutic exercise and home program  Decreased strength - therapeutic exercise, therapeutic activities and home program  STG/LTGs have been met or progress has been made towards goals:  Yes (See Goal flow sheet completed today.)  Assessment of Progress: The patient's condition is improving.  The patient's condition has potential to improve.  Self Management Plans:  Patient has been instructed in a home treatment program.  Patient  has been instructed in self management of symptoms.  Dorene continues to require the following intervention to meet STG and LTG's: PT  We will discharge this patient from PT.    Recommendations:   This patient is ready to be discharged from therapy at this facility, as she is flying back home to [place]. She will benefit from continued therapy at home, 1x/week for 6 weeks.    Please refer to the daily flowsheet for treatment today,  total treatment time and time spent performing 1:1 timed codes.

## 2021-10-20 ENCOUNTER — PRE VISIT (OUTPATIENT)
Dept: SURGERY | Facility: CLINIC | Age: 71
End: 2021-10-20

## 2021-10-21 ENCOUNTER — TELEPHONE (OUTPATIENT)
Dept: ORTHOPEDICS | Facility: CLINIC | Age: 71
End: 2021-10-21

## 2021-10-21 NOTE — TELEPHONE ENCOUNTER
Called and LVM. She should continue her precautions until she is evaluated at her next appointment.     Yuridia HILTON

## 2021-10-21 NOTE — TELEPHONE ENCOUNTER
[facility] Call Center    Phone Message    May a detailed message be left on voicemail: yes     Reason for Call: Other: patient would like a call back      Action Taken: Message routed to:  [facility] ([facility] ortho    Travel Screening: Not Applicable    Patient says there were 3 precautions that Jasmine & Abhishek warned her about and she wants to know if these are still in affect or if she can adjust them

## 2021-11-11 ENCOUNTER — DOCUMENTATION ONLY (OUTPATIENT)
Dept: ORTHOPEDICS | Facility: CLINIC | Age: 71
End: 2021-11-11

## 2021-11-11 NOTE — PROGRESS NOTES
[facility] signed and faxed to [phone]  Sent to be scanned into chart    Akila Wright

## 2022-05-02 DIAGNOSIS — Z47.89 ORTHOPEDIC AFTERCARE: ICD-10-CM

## 2022-05-02 DIAGNOSIS — M25.551 RIGHT HIP PAIN: Primary | ICD-10-CM

## 2022-07-13 ENCOUNTER — TELEPHONE (OUTPATIENT)
Dept: ORTHOPEDICS | Facility: CLINIC | Age: 72
End: 2022-07-13

## 2022-07-13 NOTE — TELEPHONE ENCOUNTER
[facility]    Phone Message    May a detailed message be left on voicemail: yes     Reason for Call: Other: Pt is coming from out of town from [place] only has the one day to get in, and is wanting to get an appt for Thursday Aug 11th wants to know if she can be ft in into an acosta. Please call or email patient regarding this     Action Taken: Other: [facility]    Travel Screening: Not Applicable

## 2022-07-14 NOTE — TELEPHONE ENCOUNTER
I called Fatimah back and left her a voicemail offering her a 3pm appointment on 8/11/22 with Dr. Weiss. I provided our call back number.  Radha De Paz ATC

## 2022-08-11 ENCOUNTER — ANCILLARY PROCEDURE (OUTPATIENT)
Dept: GENERAL RADIOLOGY | Facility: CLINIC | Age: 72
End: 2022-08-11
Attending: ORTHOPAEDIC SURGERY
Payer: MEDICARE

## 2022-08-11 ENCOUNTER — OFFICE VISIT (OUTPATIENT)
Dept: ORTHOPEDICS | Facility: CLINIC | Age: 72
End: 2022-08-11
Payer: MEDICARE

## 2022-08-11 VITALS — WEIGHT: 184 LBS | BODY MASS INDEX: 30.66 KG/M2 | HEIGHT: 65 IN

## 2022-08-11 DIAGNOSIS — S49.92XD INJURY OF LEFT SHOULDER, SUBSEQUENT ENCOUNTER: Primary | ICD-10-CM

## 2022-08-11 DIAGNOSIS — S49.90XA SHOULDER INJURY: ICD-10-CM

## 2022-08-11 DIAGNOSIS — Z96.643 STATUS POST TOTAL REPLACEMENT OF BOTH HIPS: ICD-10-CM

## 2022-08-11 DIAGNOSIS — Z47.89 ORTHOPEDIC AFTERCARE: ICD-10-CM

## 2022-08-11 DIAGNOSIS — M24.412 RECURRENT DISLOCATION OF LEFT SHOULDER: Primary | ICD-10-CM

## 2022-08-11 PROCEDURE — 99212 OFFICE O/P EST SF 10 MIN: CPT | Performed by: ORTHOPAEDIC SURGERY

## 2022-08-11 PROCEDURE — 73030 X-RAY EXAM OF SHOULDER: CPT | Mod: LT | Performed by: RADIOLOGY

## 2022-08-11 RX ORDER — SUMATRIPTAN 100 MG/1
TABLET, FILM COATED ORAL
COMMUNITY
Start: 2022-07-15

## 2022-08-11 RX ORDER — LOSARTAN POTASSIUM 50 MG/1
100 TABLET ORAL DAILY
COMMUNITY
Start: 2022-05-11 | End: 2024-09-05

## 2022-08-11 RX ORDER — PROPRANOLOL HYDROCHLORIDE 40 MG/1
40 TABLET ORAL DAILY
COMMUNITY
Start: 2022-06-03

## 2022-08-11 ASSESSMENT — HOOS JR
RISING FROM SITTING: MILD
HOOS JR TOTAL INTERVAL SCORE: 92.34

## 2022-08-11 NOTE — LETTER
8/11/2022         RE: Dorene Mendieta  [id number]   [address]  [facility] [address]        Dear Colleague,    Thank you for referring your patient, Dorene Mendieta, to the [facility] [place]. Please see a copy of my visit note below.    Chief Complaint:   1. Follow up, 1 year    Procedures:  1. Right total hip arthroplasty -9/30/21 with Dr. Weiss    History:  Fatimah mendieta is a very pleasant 71 year old patient s/p above procedure, here for ~ 1 year follow up. She has done very well since surgery. Unfortunately, she did have fall last week resulting in a left shoulder dislocation. Reduced in the ED. She is doing better today, does have some soreness in her left hip. Prior to the fall, she had been able to perform all desired activities including hiking and gardening. She has noticed some anterior hip pain with deep flexion activities, such as pulling weeds. Does not have any other concerns. Not taking any medicine for pain.     Exam:     General: Awake, Alert, and oriented. Articulates and communicates with a normal affect     Right Lower Extremity:    Incisions well healed without evidence of infection    No tenderness to palpation about the right hip or knee    Normal range of motion, smooth and painless    Neurovascularly intact    Gait: Smooth symmetric gait    Imaging:  Radiographs of the pelvis and L shoulder from 8/11/22 were independently reviewed by me and findings were discussed with the patient today. The imaging demonstrates stable bilateral KAREN, no acute osseous abnormality of the L shoulder.     Medications:     Current Outpatient Medications:      acetaminophen (TYLENOL) 325 MG tablet, Take 2 tablets (650 mg) by mouth every 4 hours as needed for other, Disp: 60 tablet, Rfl: 0     albuterol (PROAIR HFA/PROVENTIL HFA/VENTOLIN HFA) 108 (90 Base) MCG/ACT inhaler, Inhale 2 puffs into the lungs every 6 hours as needed , Disp: , Rfl:      aspirin (ASA) 81 MG EC tablet, Take 2  tablets (162 mg) by mouth daily, Disp: 60 tablet, Rfl: 0     Calcium-Vitamin D-Vitamin K (CALCIUM + D) 500-1000-40 MG-UNT-MCG CHEW, CALCIUM + D 500-1000-40 MG-UNT-MCG CHEW (Patient not taking: Reported on 9/27/2021), Disp: , Rfl:      cholecalciferol 50 MCG (2000 UT) tablet, Take 2,000 Units by mouth , Disp: , Rfl:      escitalopram (LEXAPRO) 10 MG tablet, Take 10 mg by mouth every morning , Disp: , Rfl:      losartan (COZAAR) 50 MG tablet, Take 100 mg by mouth daily, Disp: , Rfl:      magnesium 250 MG tablet, Take 250 mg by mouth (Patient not taking: Reported on 9/27/2021), Disp: , Rfl:      Multiple Vitamins-Minerals (MULTI FOR HER 50+) TABS, , Disp: , Rfl:      Omega-3 Fatty Acids (FISH OIL) 1200 MG capsule, , Disp: , Rfl:      omeprazole (PRILOSEC) 20 MG DR capsule, TAKE 1 CAPSULE BY MOUTH DAILY FOR 8 WEEKS, Disp: , Rfl:      oxyCODONE (ROXICODONE) 5 MG tablet, Take 1 tablet (5 mg) by mouth every 4 hours as needed (Patient not taking: Reported on 10/14/2021), Disp: 40 tablet, Rfl: 0     polyethylene glycol (MIRALAX) 17 g packet, Take 17 g by mouth daily, Disp: 7 packet, Rfl: 0     propranolol (INDERAL) 40 MG tablet, Take 40 mg by mouth daily, Disp: , Rfl:      propylene glycol (SYSTANE BALANCE) 0.6 % SOLN ophthalmic solution, , Disp: , Rfl:      senna-docusate (SENOKOT-S/PERICOLACE) 8.6-50 MG tablet, Take 1 tablet by mouth 2 times daily (Patient not taking: Reported on 10/14/2021), Disp: 30 tablet, Rfl: 0     SUMAtriptan (IMITREX) 100 MG tablet, TAKE 1 TABLET BY MOUTH AT THE ONSET OF A MIGRAINE WITH 800MG OF IBUPROFEN. TAKE ANOTHER TABLET BY MOUTH 2 HOURS LATER IF SYMPTOMS DO NOT IMPROVE, Disp: , Rfl:      zolpidem (AMBIEN) 10 MG tablet, Take 10 mg by mouth nightly as needed , Disp: , Rfl:     Assesment:  Doing very well one year s/p R KAREN with Dr. Weiss. Fatimah is also recovering well after L shoulder dislocation last week. She is going to see her PT to begin rehabing the L shoulder. We reviewed the mechanism  of hip dislocation follow hip arthroplasty, Fatimah expressed understanding.     Plan:   -Follow up with Dr. Weiss in 5 years, sooner if needed.     Gabby Chiang PA-C 8/11/2022 7:49 PM  Orthopedic Surgery

## 2022-08-11 NOTE — NURSING NOTE
"Reason For Visit:   Chief Complaint   Patient presents with     RECHECK     Right KAREN DOS 9/29/21. Can do all things. Just some pain with sitting up from lying down. Has been told gait is off. Had a fall on Saturday 8/6.       Ht 1.651 m (5' 5\")   Wt 83.5 kg (184 lb)   BMI 30.62 kg/m           Radha De Paz ATC    "

## 2022-08-12 NOTE — PROGRESS NOTES
Chief Complaint:   1. Follow up, 1 year    Procedures:  1. Right total hip arthroplasty -9/30/21 with Dr. Weiss    History:  Fatimah mendieta is a very pleasant 71 year old patient s/p above procedure, here for ~ 1 year follow up. She has done very well since surgery. Unfortunately, she did have fall last week resulting in a left shoulder dislocation. Reduced in the ED. She is doing better today, does have some soreness in her left hip. Prior to the fall, she had been able to perform all desired activities including hiking and gardening. She has noticed some anterior hip pain with deep flexion activities, such as pulling weeds. Does not have any other concerns. Not taking any medicine for pain.     Exam:     General: Awake, Alert, and oriented. Articulates and communicates with a normal affect     Right Lower Extremity:    Incisions well healed without evidence of infection    No tenderness to palpation about the right hip or knee    Normal range of motion, smooth and painless    Neurovascularly intact    Gait: Smooth symmetric gait    Imaging:  Radiographs of the pelvis and L shoulder from 8/11/22 were independently reviewed by me and findings were discussed with the patient today. The imaging demonstrates stable bilateral KAREN, no acute osseous abnormality of the L shoulder.     Medications:     Current Outpatient Medications:      acetaminophen (TYLENOL) 325 MG tablet, Take 2 tablets (650 mg) by mouth every 4 hours as needed for other, Disp: 60 tablet, Rfl: 0     albuterol (PROAIR HFA/PROVENTIL HFA/VENTOLIN HFA) 108 (90 Base) MCG/ACT inhaler, Inhale 2 puffs into the lungs every 6 hours as needed , Disp: , Rfl:      aspirin (ASA) 81 MG EC tablet, Take 2 tablets (162 mg) by mouth daily, Disp: 60 tablet, Rfl: 0     Calcium-Vitamin D-Vitamin K (CALCIUM + D) 500-1000-40 MG-UNT-MCG CHEW, CALCIUM + D 500-1000-40 MG-UNT-MCG CHEW (Patient not taking: Reported on 9/27/2021), Disp: , Rfl:      cholecalciferol 50 MCG (2000 UT)  tablet, Take 2,000 Units by mouth , Disp: , Rfl:      escitalopram (LEXAPRO) 10 MG tablet, Take 10 mg by mouth every morning , Disp: , Rfl:      losartan (COZAAR) 50 MG tablet, Take 100 mg by mouth daily, Disp: , Rfl:      magnesium 250 MG tablet, Take 250 mg by mouth (Patient not taking: Reported on 9/27/2021), Disp: , Rfl:      Multiple Vitamins-Minerals (MULTI FOR HER 50+) TABS, , Disp: , Rfl:      Omega-3 Fatty Acids (FISH OIL) 1200 MG capsule, , Disp: , Rfl:      omeprazole (PRILOSEC) 20 MG DR capsule, TAKE 1 CAPSULE BY MOUTH DAILY FOR 8 WEEKS, Disp: , Rfl:      oxyCODONE (ROXICODONE) 5 MG tablet, Take 1 tablet (5 mg) by mouth every 4 hours as needed (Patient not taking: Reported on 10/14/2021), Disp: 40 tablet, Rfl: 0     polyethylene glycol (MIRALAX) 17 g packet, Take 17 g by mouth daily, Disp: 7 packet, Rfl: 0     propranolol (INDERAL) 40 MG tablet, Take 40 mg by mouth daily, Disp: , Rfl:      propylene glycol (SYSTANE BALANCE) 0.6 % SOLN ophthalmic solution, , Disp: , Rfl:      senna-docusate (SENOKOT-S/PERICOLACE) 8.6-50 MG tablet, Take 1 tablet by mouth 2 times daily (Patient not taking: Reported on 10/14/2021), Disp: 30 tablet, Rfl: 0     SUMAtriptan (IMITREX) 100 MG tablet, TAKE 1 TABLET BY MOUTH AT THE ONSET OF A MIGRAINE WITH 800MG OF IBUPROFEN. TAKE ANOTHER TABLET BY MOUTH 2 HOURS LATER IF SYMPTOMS DO NOT IMPROVE, Disp: , Rfl:      zolpidem (AMBIEN) 10 MG tablet, Take 10 mg by mouth nightly as needed , Disp: , Rfl:     Assesment:  Doing very well one year s/p R KAREN with Dr. Weiss. Fatimah is also recovering well after L shoulder dislocation last week. She is going to see her PT to begin rehabing the L shoulder. We reviewed the mechanism of hip dislocation follow hip arthroplasty, Fatimah expressed understanding.     Plan:   -Follow up with Dr. Weiss in 5 years, sooner if needed.     Gabby Chiang PA-C 8/11/2022 7:49 PM  Orthopedic Surgery

## 2022-08-15 DIAGNOSIS — S49.90XA SHOULDER INJURY: Primary | ICD-10-CM

## 2022-08-16 ENCOUNTER — APPOINTMENT (OUTPATIENT)
Dept: REHABILITATION | Age: 72
End: 2022-08-16

## 2022-08-16 ENCOUNTER — HOSPITAL ENCOUNTER (OUTPATIENT)
Dept: REHABILITATION | Age: 72
Discharge: STILL ADMITTED OR ADMINISTRATIVE | End: 2022-08-16

## 2022-08-16 PROCEDURE — 97140 MANUAL THERAPY 1/> REGIONS: CPT | Performed by: PHYSICAL THERAPIST

## 2022-08-16 PROCEDURE — 10004173 HB COUNTER-THERAPY VISIT PT: Performed by: PHYSICAL THERAPIST

## 2022-08-16 PROCEDURE — 97162 PT EVAL MOD COMPLEX 30 MIN: CPT | Performed by: PHYSICAL THERAPIST

## 2022-08-16 PROCEDURE — 97110 THERAPEUTIC EXERCISES: CPT | Performed by: PHYSICAL THERAPIST

## 2022-08-19 ENCOUNTER — HOSPITAL ENCOUNTER (OUTPATIENT)
Dept: REHABILITATION | Age: 72
Discharge: STILL ADMITTED OR ADMINISTRATIVE | End: 2022-08-19

## 2022-08-19 PROCEDURE — 97110 THERAPEUTIC EXERCISES: CPT | Performed by: PHYSICAL THERAPIST

## 2022-08-19 PROCEDURE — 10004173 HB COUNTER-THERAPY VISIT PT: Performed by: PHYSICAL THERAPIST

## 2022-08-19 PROCEDURE — 97140 MANUAL THERAPY 1/> REGIONS: CPT | Performed by: PHYSICAL THERAPIST

## 2022-08-22 ENCOUNTER — APPOINTMENT (OUTPATIENT)
Dept: REHABILITATION | Age: 72
End: 2022-08-22

## 2022-08-23 ENCOUNTER — HOSPITAL ENCOUNTER (OUTPATIENT)
Dept: REHABILITATION | Age: 72
Discharge: STILL ADMITTED OR ADMINISTRATIVE | End: 2022-08-23

## 2022-08-23 PROCEDURE — 10004173 HB COUNTER-THERAPY VISIT PT: Performed by: PHYSICAL THERAPIST

## 2022-08-23 PROCEDURE — 97110 THERAPEUTIC EXERCISES: CPT | Performed by: PHYSICAL THERAPIST

## 2022-08-23 PROCEDURE — 97140 MANUAL THERAPY 1/> REGIONS: CPT | Performed by: PHYSICAL THERAPIST

## 2022-08-23 ASSESSMENT — ENCOUNTER SYMPTOMS
PAIN DESCRIPTION: SNAPPING
QUALITY: THROBBING
QUALITY: SORE
QUALITY: STIFF
SUBJECTIVE PAIN PROGRESSION: NO CHANGE
PAIN SEVERITY NOW: 3
QUALITY: ACHE
PAIN SCALE AT HIGHEST: 6
QUALITY: SHARP
PAIN SCALE AT HIGHEST: 9
ALLEVIATING FACTORS: OVER-THE-COUNTER MEDICATION
PAIN SEVERITY NOW: 3
ALLEVIATING FACTORS: AVOIDING MOVEMENT IN INVOLVED AREA
PAIN SEVERITY NOW: 4

## 2022-08-25 ENCOUNTER — HOSPITAL ENCOUNTER (OUTPATIENT)
Dept: REHABILITATION | Age: 72
Discharge: STILL ADMITTED OR ADMINISTRATIVE | End: 2022-08-25

## 2022-08-25 PROCEDURE — 97110 THERAPEUTIC EXERCISES: CPT | Performed by: PHYSICAL THERAPIST

## 2022-08-25 PROCEDURE — 97140 MANUAL THERAPY 1/> REGIONS: CPT | Performed by: PHYSICAL THERAPIST

## 2022-08-25 PROCEDURE — 10004173 HB COUNTER-THERAPY VISIT PT: Performed by: PHYSICAL THERAPIST

## 2022-08-29 ENCOUNTER — HOSPITAL ENCOUNTER (OUTPATIENT)
Dept: REHABILITATION | Age: 72
Discharge: STILL ADMITTED OR ADMINISTRATIVE | End: 2022-08-29

## 2022-08-29 PROCEDURE — 97140 MANUAL THERAPY 1/> REGIONS: CPT | Performed by: PHYSICAL THERAPIST

## 2022-08-29 PROCEDURE — 10004173 HB COUNTER-THERAPY VISIT PT: Performed by: PHYSICAL THERAPIST

## 2022-08-29 PROCEDURE — 97110 THERAPEUTIC EXERCISES: CPT | Performed by: PHYSICAL THERAPIST

## 2022-09-01 ENCOUNTER — TELEPHONE (OUTPATIENT)
Dept: ORTHOPEDICS | Facility: CLINIC | Age: 72
End: 2022-09-01

## 2022-09-01 NOTE — TELEPHONE ENCOUNTER
[facility] Call [facility]    Phone Message    May a detailed message be left on voicemail: yes     Reason for Call: Other: Please fax a SIGNED PT order to [facility] fax [phone]     Action Taken: Other: ortho     Travel Screening: Not Applicable

## 2022-09-02 ENCOUNTER — HOSPITAL ENCOUNTER (OUTPATIENT)
Dept: REHABILITATION | Age: 72
Discharge: STILL ADMITTED OR ADMINISTRATIVE | End: 2022-09-02
Attending: INTERNAL MEDICINE

## 2022-09-02 PROCEDURE — 10004173 HB COUNTER-THERAPY VISIT PT: Performed by: PHYSICAL THERAPIST

## 2022-09-02 PROCEDURE — 97110 THERAPEUTIC EXERCISES: CPT | Performed by: PHYSICAL THERAPIST

## 2022-09-02 PROCEDURE — 97140 MANUAL THERAPY 1/> REGIONS: CPT | Performed by: PHYSICAL THERAPIST

## 2022-09-06 ENCOUNTER — HOSPITAL ENCOUNTER (OUTPATIENT)
Dept: REHABILITATION | Age: 72
Discharge: STILL ADMITTED OR ADMINISTRATIVE | End: 2022-09-06

## 2022-09-06 PROCEDURE — 97110 THERAPEUTIC EXERCISES: CPT | Performed by: PHYSICAL THERAPIST

## 2022-09-06 PROCEDURE — 97140 MANUAL THERAPY 1/> REGIONS: CPT | Performed by: PHYSICAL THERAPIST

## 2022-09-06 PROCEDURE — 10004173 HB COUNTER-THERAPY VISIT PT: Performed by: PHYSICAL THERAPIST

## 2022-09-21 ASSESSMENT — ENCOUNTER SYMPTOMS
PAIN SEVERITY NOW: 2
PAIN SEVERITY NOW: 4
PAIN SEVERITY NOW: 2

## 2022-10-03 ENCOUNTER — HEALTH MAINTENANCE LETTER (OUTPATIENT)
Age: 72
End: 2022-10-03

## 2023-02-08 NOTE — PROGRESS NOTES
Goal Outcome Evaluation:                  Physical Therapy Initial Evaluation  Subjective:  The history is provided by the patient.   Patient Health History         Pain is reported as 5/10 on pain scale.  General health as reported by patient is excellent.  Pertinent medical history includes: depression, overweight, high blood pressure, osteoporosis and other (anxiety).   Red flags:  None as reported by patient.  Medical allergies: none.   Surgeries include:  Orthopedic surgery. Other surgery history details: L KAREN, R TKA, L hip abductor tear.    Current medications:  Anti-depressants, high blood pressure medication and pain medication.    Current occupation is retired teacher.      Other job tasks: 2 young grandkids.                Therapist Generated HPI Evaluation  Problem details: Pain has been ongoing for >1 year. Pt is now s/p R KAREN, posterior approach, on 9/29/21.         Type of problem:  Right hip.    This is a new condition.  Condition occurred with:  Degenerative joint disease.  Where condition occurred: for unknown reasons.  Patient reports pain:  Anterior, groin and lateral.  Pain is described as aching and is intermittent.  Pain radiates to:  Gluteals. Pain timing: no pattern.  Since onset symptoms are gradually improving.  Associated symptoms:  Loss of motion/stiffness, edema and loss of strength. Symptoms are exacerbated by ascending stairs, bending/squatting, descending stairs, sitting, transfers, standing and walking  and relieved by activity/movement, ice and analgesics.      Barriers include:  None as reported by patient.            Objective:    Gait:    Gait Type:  Antalgic   Weight Bearing Status:  WBAT   Assistive Devices:  Walker  Deviations:  General Deviations:  Patricia decr, stance time decr and stride length decr               Lumbar/SI Evaluation  ROM:  AROM Lumbar: normal (within precautions)                                                          Hip Evaluation  HIP AROM:  AROM:    Left Hip:     Normal    Right Hip:    Normal (within precautions)                    Hip Strength:    Flexion:   Left: 5/5   Pain:  Right: 3/5   Pain:                                          Hip Palpation:      Right hip tenderness present at:  Piriformis and Gluteus Medius  Functional Testing:          Quad:      Bilateral leg squat:  Excessive anterior knee excursion and mild loss of control            Knee Evaluation:  ROM:  AROM: normal            Strength:         Quad Set Left: WNL    Pain:   Quad Set Right: Good    Pain:                  General     ROS    Assessment/Plan:    Patient is a 71 year old female with right side hip complaints.    Patient has the following significant findings with corresponding treatment plan.                Diagnosis 1:  S/p R KAREN, with posterior hip precautions    Pain -  hot/cold therapy, manual therapy, self management, education and home program  Decreased ROM/flexibility - manual therapy, therapeutic exercise and home program  Decreased strength - therapeutic exercise, therapeutic activities and home program    Therapy Evaluation Codes:   1) History comprised of:   Personal factors that impact the plan of care:      None.    Comorbidity factors that impact the plan of care are:      None.     Medications impacting care: None.  2) Examination of Body Systems comprised of:   Body structures and functions that impact the plan of care:      Hip, Knee and Lumbar spine.   Activity limitations that impact the plan of care are:      Bathing, Bending, Cooking, Driving, Dressing, Reading/Computer work, Sitting, Squatting/kneeling, Stairs, Standing, Walking and Sleeping.  3) Clinical presentation characteristics are:   Stable/Uncomplicated.  4) Decision-Making    Low complexity using standardized patient assessment instrument and/or measureable assessment of functional outcome.  Cumulative Therapy Evaluation is: Low complexity.    Previous and current functional limitations:  (See Goal Flow Sheet for this information)     Short term and Long term goals: (See Goal Flow Sheet for this information)     Communication ability:  Patient appears to be able to clearly communicate and understand verbal and written communication and follow directions correctly.  Treatment Explanation - The following has been discussed with the patient:   RX ordered/plan of care  Anticipated outcomes  Possible risks and side effects  This patient would benefit from PT intervention to resume normal activities.   Rehab potential is good.    Frequency:  2 X week, once daily  Duration:  for 6 weeks  Discharge Plan:  Achieve all LTG.  Independent in home treatment program.  Reach maximal therapeutic benefit.    Please refer to the daily flowsheet for treatment today, total treatment time and time spent performing 1:1 timed codes.

## 2023-02-11 ENCOUNTER — HEALTH MAINTENANCE LETTER (OUTPATIENT)
Age: 73
End: 2023-02-11

## 2023-10-21 ENCOUNTER — HEALTH MAINTENANCE LETTER (OUTPATIENT)
Age: 73
End: 2023-10-21

## 2024-03-09 ENCOUNTER — HEALTH MAINTENANCE LETTER (OUTPATIENT)
Age: 74
End: 2024-03-09

## 2024-08-13 ENCOUNTER — TELEPHONE (OUTPATIENT)
Dept: ORTHOPEDICS | Facility: CLINIC | Age: 74
End: 2024-08-13
Payer: MEDICARE

## 2024-08-13 NOTE — TELEPHONE ENCOUNTER
At the request of our PA, I called Fatimah to offer her an appointment with Dr Weiss at the [facility] on Thursday 9/5.   As of right now, there are 2 openings: a 9:40am and a 1:40pm. She may take either appointment. If they are both full when patient calls back, please contact team to work her in at another time during that day.   Radha De Paz ATC

## 2024-09-05 ENCOUNTER — OFFICE VISIT (OUTPATIENT)
Dept: ORTHOPEDICS | Facility: CLINIC | Age: 74
End: 2024-09-05
Payer: MEDICARE

## 2024-09-05 ENCOUNTER — ANCILLARY PROCEDURE (OUTPATIENT)
Dept: GENERAL RADIOLOGY | Facility: CLINIC | Age: 74
End: 2024-09-05
Attending: ORTHOPAEDIC SURGERY
Payer: MEDICARE

## 2024-09-05 DIAGNOSIS — Z47.89 ORTHOPEDIC AFTERCARE: Primary | ICD-10-CM

## 2024-09-05 DIAGNOSIS — M25.551 RIGHT HIP PAIN: Primary | ICD-10-CM

## 2024-09-05 DIAGNOSIS — M25.551 RIGHT HIP PAIN: ICD-10-CM

## 2024-09-05 PROCEDURE — 99213 OFFICE O/P EST LOW 20 MIN: CPT | Performed by: ORTHOPAEDIC SURGERY

## 2024-09-05 PROCEDURE — 73502 X-RAY EXAM HIP UNI 2-3 VIEWS: CPT | Mod: RT | Performed by: RADIOLOGY

## 2024-09-05 RX ORDER — TRAZODONE HYDROCHLORIDE 100 MG/1
TABLET ORAL
COMMUNITY

## 2024-09-05 ASSESSMENT — HOOS JR
WALKING ON UNEVEN SURFACE: MILD
SITTING: MILD
HOOS JR TOTAL INTERVAL SCORE: 85.26

## 2024-09-05 NOTE — PROGRESS NOTES
Chief Complaint: RECHECK (3 years s/p Right KAREN )       HPI: Dorene Ortega returns today in follow-up for her right hip.  She reports she is doing very well.  She has no pain in the hip.  She has been very active recently doing mountain-type hiking.  She is about to leave for [place] with her son Ian.  She is here because she had an area of her incision which spit out a hard granular white mass of millimeter by millimeter.  This has not been associated with any drainage (she had a bandage on it for a week and it did not spot).  Also here for routine follow-up because she is in town (lives in [place]).    Current Status:  [facility]: 85.26  [facility]:  Global Mental Health Score - (P) 20  Global Physical Health Score - (P) 20  PROMIS TOTAL - SUBSCORES - (P) 40  Medications and allergies are documented in the EMR and have been reviewed.    Current Outpatient Medications:     acetaminophen (TYLENOL) 325 MG tablet, Take 2 tablets (650 mg) by mouth every 4 hours as needed for other, Disp: 60 tablet, Rfl: 0    albuterol (PROAIR HFA/PROVENTIL HFA/VENTOLIN HFA) 108 (90 Base) MCG/ACT inhaler, Inhale 2 puffs into the lungs every 6 hours as needed , Disp: , Rfl:     aspirin (ASA) 81 MG EC tablet, Take 2 tablets (162 mg) by mouth daily, Disp: 60 tablet, Rfl: 0    Calcium-Vitamin D-Vitamin K (CALCIUM + D) 500-1000-40 MG-UNT-MCG CHEW, CALCIUM + D 500-1000-40 MG-UNT-MCG CHEW (Patient not taking: Reported on 9/27/2021), Disp: , Rfl:     cholecalciferol 50 MCG (2000 UT) tablet, Take 2,000 Units by mouth , Disp: , Rfl:     escitalopram (LEXAPRO) 10 MG tablet, Take 10 mg by mouth every morning , Disp: , Rfl:     magnesium 250 MG tablet, Take 250 mg by mouth (Patient not taking: Reported on 9/27/2021), Disp: , Rfl:     Multiple Vitamins-Minerals (MULTI FOR HER 50+) TABS, , Disp: , Rfl:     Omega-3 Fatty Acids (FISH OIL) 1200 MG capsule, , Disp: , Rfl:     omeprazole (PRILOSEC) 20 MG DR capsule, TAKE 1 CAPSULE BY MOUTH DAILY FOR 8 WEEKS,  Disp: , Rfl:     oxyCODONE (ROXICODONE) 5 MG tablet, Take 1 tablet (5 mg) by mouth every 4 hours as needed (Patient not taking: Reported on 10/14/2021), Disp: 40 tablet, Rfl: 0    polyethylene glycol (MIRALAX) 17 g packet, Take 17 g by mouth daily, Disp: 7 packet, Rfl: 0    propranolol (INDERAL) 40 MG tablet, Take 40 mg by mouth daily, Disp: , Rfl:     propylene glycol (SYSTANE BALANCE) 0.6 % SOLN ophthalmic solution, , Disp: , Rfl:     senna-docusate (SENOKOT-S/PERICOLACE) 8.6-50 MG tablet, Take 1 tablet by mouth 2 times daily (Patient not taking: Reported on 10/14/2021), Disp: 30 tablet, Rfl: 0    SUMAtriptan (IMITREX) 100 MG tablet, TAKE 1 TABLET BY MOUTH AT THE ONSET OF A MIGRAINE WITH 800MG OF IBUPROFEN. TAKE ANOTHER TABLET BY MOUTH 2 HOURS LATER IF SYMPTOMS DO NOT IMPROVE, Disp: , Rfl:     traZODone (DESYREL) 100 MG tablet, TAKE 1 TO 2 TABLETS BY MOUTH AT NIGHT, Disp: , Rfl:     Allergies: Penicillins, Quinolones, and Sulfa antibiotics        Physical Exam:  On physical examination the patient appears the stated age, is in no acute distress, affect is appropriate, and breathing is non-labored.    There were no vitals taken for this visit.  There is no height or weight on file to calculate BMI.    Rises from chair: Easily  Gait: Normal  ROM: Fluid and painless with over 90 of flexion and painless internal and external rotation and flexion.  Elected her incision.  It is benign.  There is no redness there is no induration.  I took off the Band-Aid over the area where she had spit stitch.  There is a small additional area of what appears to be a calcified stitch.  I remove this after prepping with Betadine with a sterile pickup.  There is no depth to the wound.  It less than 2 mm.  There is nothing to express.    We reviewed the radiographs together. These show the normal progression for a total hip arthroplasty without evidence of loosening or subsidence.     Assessment and plan: Benign appearing incision with a  small area where she spit out a calcified Vicryl stitch.  Reassured the patient.  She is can to keep this area covered with a Band-Aid while it is healing over and she is going to avoid soaks until that time.  She is coming back from [place] in a couple weeks and I will see her back then just to make sure this is healed over.    20 minutes were spent on the patient's visit today.     Dominic Weiss

## 2024-09-05 NOTE — LETTER
9/5/2024      Dorene Ortega  [id number]   [address]  [facility] [address]      Dear Colleague,    Thank you for referring your patient, Dorene Ortega, to the [facility] [place]. Please see a copy of my visit note below.    Chief Complaint: RECHECK (3 years s/p Right KAREN )       HPI: Dorene Ortega returns today in follow-up for her right hip.  She reports she is doing very well.  She has no pain in the hip.  She has been very active recently doing mountain-type hiking.  She is about to leave for [place] with her son Ian.  She is here because she had an area of her incision which spit out a hard granular white mass of millimeter by millimeter.  This has not been associated with any drainage (she had a bandage on it for a week and it did not spot).  Also here for routine follow-up because she is in town (lives in [place]).    Current Status:  [facility]: 85.26  [facility]:  Global Mental Health Score - (P) 20  Global Physical Health Score - (P) 20  PROMIS TOTAL - SUBSCORES - (P) 40  Medications and allergies are documented in the EMR and have been reviewed.    Current Outpatient Medications:      acetaminophen (TYLENOL) 325 MG tablet, Take 2 tablets (650 mg) by mouth every 4 hours as needed for other, Disp: 60 tablet, Rfl: 0     albuterol (PROAIR HFA/PROVENTIL HFA/VENTOLIN HFA) 108 (90 Base) MCG/ACT inhaler, Inhale 2 puffs into the lungs every 6 hours as needed , Disp: , Rfl:      aspirin (ASA) 81 MG EC tablet, Take 2 tablets (162 mg) by mouth daily, Disp: 60 tablet, Rfl: 0     Calcium-Vitamin D-Vitamin K (CALCIUM + D) 500-1000-40 MG-UNT-MCG CHEW, CALCIUM + D 500-1000-40 MG-UNT-MCG CHEW (Patient not taking: Reported on 9/27/2021), Disp: , Rfl:      cholecalciferol 50 MCG (2000 UT) tablet, Take 2,000 Units by mouth , Disp: , Rfl:      escitalopram (LEXAPRO) 10 MG tablet, Take 10 mg by mouth every morning , Disp: , Rfl:      magnesium 250 MG tablet, Take 250 mg by mouth (Patient not taking: Reported  on 9/27/2021), Disp: , Rfl:      Multiple Vitamins-Minerals (MULTI FOR HER 50+) TABS, , Disp: , Rfl:      Omega-3 Fatty Acids (FISH OIL) 1200 MG capsule, , Disp: , Rfl:      omeprazole (PRILOSEC) 20 MG DR capsule, TAKE 1 CAPSULE BY MOUTH DAILY FOR 8 WEEKS, Disp: , Rfl:      oxyCODONE (ROXICODONE) 5 MG tablet, Take 1 tablet (5 mg) by mouth every 4 hours as needed (Patient not taking: Reported on 10/14/2021), Disp: 40 tablet, Rfl: 0     polyethylene glycol (MIRALAX) 17 g packet, Take 17 g by mouth daily, Disp: 7 packet, Rfl: 0     propranolol (INDERAL) 40 MG tablet, Take 40 mg by mouth daily, Disp: , Rfl:      propylene glycol (SYSTANE BALANCE) 0.6 % SOLN ophthalmic solution, , Disp: , Rfl:      senna-docusate (SENOKOT-S/PERICOLACE) 8.6-50 MG tablet, Take 1 tablet by mouth 2 times daily (Patient not taking: Reported on 10/14/2021), Disp: 30 tablet, Rfl: 0     SUMAtriptan (IMITREX) 100 MG tablet, TAKE 1 TABLET BY MOUTH AT THE ONSET OF A MIGRAINE WITH 800MG OF IBUPROFEN. TAKE ANOTHER TABLET BY MOUTH 2 HOURS LATER IF SYMPTOMS DO NOT IMPROVE, Disp: , Rfl:      traZODone (DESYREL) 100 MG tablet, TAKE 1 TO 2 TABLETS BY MOUTH AT NIGHT, Disp: , Rfl:     Allergies: Penicillins, Quinolones, and Sulfa antibiotics        Physical Exam:  On physical examination the patient appears the stated age, is in no acute distress, affect is appropriate, and breathing is non-labored.    There were no vitals taken for this visit.  There is no height or weight on file to calculate BMI.    Rises from chair: Easily  Gait: Normal  ROM: Fluid and painless with over 90 of flexion and painless internal and external rotation and flexion.  Elected her incision.  It is benign.  There is no redness there is no induration.  I took off the Band-Aid over the area where she had spit stitch.  There is a small additional area of what appears to be a calcified stitch.  I remove this after prepping with Betadine with a sterile pickup.  There is no depth to the  wound.  It less than 2 mm.  There is nothing to express.    We reviewed the radiographs together. These show the normal progression for a total hip arthroplasty without evidence of loosening or subsidence.     Assessment and plan: Benign appearing incision with a small area where she spit out a calcified Vicryl stitch.  Reassured the patient.  She is can to keep this area covered with a Band-Aid while it is healing over and she is going to avoid soaks until that time.  She is coming back from [place] in a couple weeks and I will see her back then just to make sure this is healed over.    20 minutes were spent on the patient's visit today.     Dominic Weiss      Again, thank you for allowing me to participate in the care of your patient.        Sincerely,        Dominic Weiss MD

## 2024-09-05 NOTE — NURSING NOTE
Reason For Visit:   Chief Complaint   Patient presents with    RECHECK     3 years s/p Right KAREN      Denies groin pain, butt pain, pain radiating down the legs     There were no vitals taken for this visit.    Pain Assessment  Patient Currently in Pain: Melly Andre CMA

## 2025-03-16 ENCOUNTER — HEALTH MAINTENANCE LETTER (OUTPATIENT)
Age: 75
End: 2025-03-16

## (undated) DEVICE — MMIS - ELECTRODE ESURG BLADE 4IN MNPLR NONSTICK XTD

## (undated) DEVICE — MMIS - LINER SCT 1000CC CRD MDVC LOCK LID SHTOF VLV CNSTR

## (undated) DEVICE — DRILL BIT FLEXIBLE REFLECTION 35MM 71362935

## (undated) DEVICE — GLOVE PROTEXIS BLUE W/NEU-THERA 8.0  2D73EB80

## (undated) DEVICE — MMIS - SHEET PATRAN TRNSF

## (undated) DEVICE — MMIS - GLOVE SURG 7.5 PROTEXIS LF CRM PF BEAD CUFF STRL

## (undated) DEVICE — MMIS - APPLICATOR 70% ISO ALC 2% CHG 26ML 13.2X13.2IN

## (undated) DEVICE — MMIS - GLOVE SURG 6.5 PROTEXIS LF CRM PF BEAD CUFF STRL

## (undated) DEVICE — LINEN MAYO STAND COVER OVERSIZE PACK 5458

## (undated) DEVICE — MMIS - SUTURE ETHIBOND 0 CT1 30IN BRAID NABSB GRN

## (undated) DEVICE — LINEN ORTHO PACK 5446

## (undated) DEVICE — SU MONOCRYL 3-0 PS-1 27" Y936H

## (undated) DEVICE — ESU GROUND PAD ADULT W/CORD E7507

## (undated) DEVICE — MMIS - PILLOW PSTN 22X15X6IN DEVON MED HIP FOAM 4 LEG

## (undated) DEVICE — MMIS - SUTURE ETHIBOND EXCEL 0 OS-4 30IN BRAID NABSB GRN

## (undated) DEVICE — MMIS - PENCIL SMKEVC 70MM NEPTUNE RCKR SWH COAT SCT SLV

## (undated) DEVICE — MMIS - SUTURE FBRWR 5 CCS-1 38IN BRAID TIES MULTISTRAND

## (undated) DEVICE — MMIS - CONTAINER SPEC 120ML BLUE 53MM 2 THRD CAP INDIV

## (undated) DEVICE — MMIS - DECANTER VIAL FLUID DSPNSR VLJT

## (undated) DEVICE — MMIS - SYSTEM FX ARTHROTUNNELER TUNNELPRO PEEK-OPTM

## (undated) DEVICE — DRSG STERI STRIP 1/2X4" R1547

## (undated) DEVICE — MMIS - NEEDLE SPNL 18GA 3.5IN REG WALL QUINCKE TIP STRL

## (undated) DEVICE — MMIS - SUTURE FBRWR 2 T-5 38IN BRAID TIES MULTISTRAND LWR

## (undated) DEVICE — MMIS - DRAPE TABLE BIG CASE STANDARD BACK

## (undated) DEVICE — DECANTER VIAL 2006S

## (undated) DEVICE — MMIS - SYRINGE 30ML 1ML CONC TIP 1ML GRAD N-PYRG DEHP-FR

## (undated) DEVICE — BLADE SAW RECIP STRK 70X6X0.025MM 0277-096-250S5

## (undated) DEVICE — STRAP KNEE/BODY 31143004

## (undated) DEVICE — MMIS - GLOVE SURG 6.5 PROTEXIS LF BLUE PF SMTH BEAD CUFF

## (undated) DEVICE — GLOVE PROTEXIS POWDER FREE 7.5 ORTHOPEDIC 2D73ET75

## (undated) DEVICE — PACK TOTAL HIP W/POUCH RIVERSIDE LATEX FREE

## (undated) DEVICE — MMIS - SUTURE ETHIBOND 1 CTX 18IN POLY CNTRL RELS BRAID 8

## (undated) DEVICE — MMIS - SEALER BP .226IN .137IN AQUAMANTYS 6

## (undated) DEVICE — MMIS - SET SURGBSN 2 GRAD PTCHR STRL LF PLASTIC 54X54IN

## (undated) DEVICE — SU VICRYL 2-0 CT-1 27" UND J259H

## (undated) DEVICE — MMIS - HOOD SRG FLYTE STRSHLD SURGICOOL PEELAWAY FACE

## (undated) DEVICE — PREP CHLORAPREP 26ML TINTED HI-LITE ORANGE 930815

## (undated) DEVICE — DRSG KERLIX 4 1/2"X4YDS ROLL 6730

## (undated) DEVICE — MMIS - ELECTRODE PT RTN C30- LB 9FT CORD NONIRRITATE

## (undated) DEVICE — MMIS - PASSER GAB/FRAT GUIDE ORG SUT VLV IMPL STRL LF

## (undated) DEVICE — MMIS - BLANKET WRM UPR BODY 78.7X29.9IN MISTRAL-AIR FORCE

## (undated) DEVICE — MMIS - GOWN SURG XL XLONG AAMI L4 IMPRV RAGLAN SLV A LN

## (undated) DEVICE — MMIS - DEVICE STRATAFIX PDO 9IN 0 MH SPRL ABS KNTLS TISS

## (undated) DEVICE — MMIS - CLEANER ESURG TIP 5X5CM DEVON LG CAUT XRAY DTCT

## (undated) DEVICE — BLADE KNIFE SURG 20 371120

## (undated) DEVICE — ADH SKIN CLOSURE PREMIERPRO EXOFIN 1.0ML 3470

## (undated) DEVICE — MMIS - ANCHOR SUT 2MM COBRAID XBRAID TT UHMWPE STRL LF

## (undated) DEVICE — SUCTION IRR SYSTEM W/O TIP INTERPULSE HANDPIECE 0210-100-000

## (undated) DEVICE — MMIS - STRAP PSTN 19X3.5IN TECLIN STRUP SLIP RING NS LF

## (undated) DEVICE — MMIS - DRAPE SURG 33X23IN 2 INCS FILM ANTIMICROBIAL IOBAN

## (undated) DEVICE — LINEN TOWEL PACK X5 5464

## (undated) DEVICE — SU ETHIBOND 5 V-37 4X30" MB66G

## (undated) DEVICE — MMIS - SUTURE VCL+ MTPS 4-0 P-3 18IN BRAID COAT ABS

## (undated) DEVICE — MMIS - MARKER MULTIINK LBL SURG STRL

## (undated) DEVICE — MMIS - SUTURE ETHIBOND 2 5-37 30IN BRAID 4 STRN NABSB GRN

## (undated) DEVICE — MMIS - CIRCUIT BRTHG EXPAND 87IN UNV FLEX2

## (undated) DEVICE — MMIS - GLOVE SURG 8 PROTEXIS LF BLUE PF SMTH BEAD CUFF

## (undated) DEVICE — MMIS - PACK SURG JNT LTX SAFE

## (undated) DEVICE — MMIS - NEEDLE SUT 1 .5 CRC TROCAR PT STRL DISP MAYO

## (undated) DEVICE — MMIS - STRAP PSTN 60X3IN DEVON BODY KN

## (undated) DEVICE — MMIS - ADHESIVE DRMBND PRINEO 22CM 3.8ML LIQUID MESH APL

## (undated) DEVICE — SPONGE LAP 18X18" X8435

## (undated) DEVICE — SU VICRYL 0 CT 36" J358H

## (undated) DEVICE — MMIS - DEVICE STRATAFIX 4-0 FS1 SPRL 30CM ABS KNTLS TISS

## (undated) DEVICE — MMIS - GLOVE SURG 7.5 PROTEXIS LF BLUE PF SMTH BEAD CUFF

## (undated) DEVICE — MMIS - DRAPE SURG 72X60IN 21X6IN U STRIP IMPRV ADH SPLIT

## (undated) DEVICE — HOOD FLYTE W/PEELAWAY 408-800-100

## (undated) DEVICE — MMIS - CAP BD PP SYRINGE TIP STRL LF

## (undated) DEVICE — BONE CLEANING TIP INTERPULSE  0210-010-000

## (undated) DEVICE — MMIS - DRAPE SURG 120X77IN REINFORCE SPLIT ABS TUBE HLDR

## (undated) DEVICE — Device

## (undated) DEVICE — MMIS - BLADE RASP 11X5MM SM TEAR XCUT STRL TPS SS

## (undated) DEVICE — MMIS - DEVICE PDO STRATAFIX 1 CTX 36CM ABS KNTLS TISS

## (undated) DEVICE — MMIS - COVER STAND 57X30IN MAYO XL STD FABRIC REINFORCE

## (undated) DEVICE — MMIS - DRESSING TRANSPARENT 12X8IN 3M TEGADERM PU FRM

## (undated) DEVICE — MMIS - SOLUTION URMTC 0.9% NACL 3L LF IRR

## (undated) DEVICE — MMIS - SPONGE SURG 4X4IN PS 16 PLY RADOPQ BAND LOW LINT

## (undated) DEVICE — SOL NACL 0.9% IRRIG 1000ML BOTTLE 2F7124

## (undated) DEVICE — MMIS - GLOVE SURG 8 PROTEXIS LF CRM PF BEAD CUFF STRL

## (undated) DEVICE — MMIS - RETRIEVER SUT 10.1IN HEWSON DRILL GUIDE LGMNT STRL

## (undated) DEVICE — SOL NACL 0.9% INJ 1000ML BAG 2B1324X

## (undated) DEVICE — MMIS - SET INTPLS BN CLN TIP SCT TUBE HNDPC

## (undated) DEVICE — GOWN XLG DISP 9545

## (undated) DEVICE — DEVICE RETRIEVER HEWSON 71111579

## (undated) DEVICE — MMIS - DEVICE PDO STRATAFIX 2-0 MH SPRL 24CM ABS KNTLS

## (undated) DEVICE — MMIS - PAD PSTN RSPBRY SWIRL 20X8X2IN DEVON FOAM

## (undated) DEVICE — MMIS - SUTURE VCL+ 1 CTX 18IN CNTRL RELS BRAID 8 STRN

## (undated) DEVICE — MMIS - COUNTER 40/70 CNT ADH NDL DEVON SHRP FOAM XL

## (undated) DEVICE — MMIS - PADDING CAST THK7/16IN 11.75X7.87IN FOAM 3M RSTN

## (undated) DEVICE — SOL WATER IRRIG 1000ML BOTTLE 2F7114

## (undated) DEVICE — MMIS - COVER SRG CNTRL STRL LF LIGHT HNDL HARMONYAIR M

## (undated) DEVICE — SUCTION MANIFOLD NEPTUNE 2 SYS 4 PORT 0702-020-000

## (undated) DEVICE — MMIS - PAD ABD 10X8IN DERMACEA ABS NONWOVEN 4 SEAL EDGE

## (undated) DEVICE — MMIS - POSITIONER PSTN PINK 9X8X4.25IN DEVON HEAD FOAM

## (undated) DEVICE — MMIS - SUTURE MERS 5-0 BP 12IN 2 ARM NABSB WHT

## (undated) DEVICE — DRAPE STERI TOWEL LG 1010

## (undated) DEVICE — STRAP STIRRUP W/SLIP 30187-030

## (undated) DEVICE — POSITIONER ABDUCTION PILLOW FOAM MED FP-ABDUCTM

## (undated) DEVICE — MMIS - SUTURE FORCE FBR 5 3/8 CRC 38IN NDL CO BRAID NABSB

## (undated) DEVICE — MMIS - SUTURE VCL+ 2-0 CT1 18IN CNTRL RELS BRAID 8 STRN

## (undated) DEVICE — MMIS - NEEDLE HPO 18GA 1.5IN BVL STRL LF DISP JELCO

## (undated) DEVICE — SYR BULB IRRIG 50ML LATEX FREE 0035280

## (undated) DEVICE — MMIS - HOOD SRG STRSHLD T5 XL PEELAWAY FACE SHLD STRL LF

## (undated) DEVICE — MMIS - KIT DRN 1/8IN 400CC 12IN RND 3 SPRG EVAC TROCAR Y

## (undated) DEVICE — GLOVE PROTEXIS W/NEU-THERA 8.0  2D73TE80

## (undated) DEVICE — MMIS - DRAPE SURG 11X7IN 2 CMPRT 2 ADH STRIP STRDRP

## (undated) DEVICE — MMIS - GOWN SURG LG AAMI L3 REINFORCE STRL BACK SET IN

## (undated) DEVICE — DRAPE IOBAN INCISE 36X23" 6651EZ

## (undated) DEVICE — MMIS - SYRINGE 20ML CONC TIP 1ML GRAD N-PYRG DEHP-FR STRL

## (undated) DEVICE — MMIS - NEEDLE SPNL 20GA 3.5IN REG WALL QUINCKE TIP STRL

## (undated) DEVICE — GOWN IMPERVIOUS SPECIALTY XLG/XLONG 32474

## (undated) DEVICE — DRSG TEGADERM ALGINATE AG 4X5" 90303

## (undated) DEVICE — DRSG TEGADERM 4X10" 1627

## (undated) RX ORDER — CEFAZOLIN SODIUM 2 G/100ML
INJECTION, SOLUTION INTRAVENOUS
Status: DISPENSED
Start: 2021-09-29

## (undated) RX ORDER — EPHEDRINE SULFATE 50 MG/ML
INJECTION, SOLUTION INTRAMUSCULAR; INTRAVENOUS; SUBCUTANEOUS
Status: DISPENSED
Start: 2021-09-29

## (undated) RX ORDER — SODIUM CHLORIDE, SODIUM LACTATE, POTASSIUM CHLORIDE, CALCIUM CHLORIDE 600; 310; 30; 20 MG/100ML; MG/100ML; MG/100ML; MG/100ML
INJECTION, SOLUTION INTRAVENOUS
Status: DISPENSED
Start: 2021-09-29

## (undated) RX ORDER — ATROPINE SULFATE 0.4 MG/ML
AMPUL (ML) INJECTION
Status: DISPENSED
Start: 2021-09-29

## (undated) RX ORDER — GABAPENTIN 100 MG/1
CAPSULE ORAL
Status: DISPENSED
Start: 2021-09-29

## (undated) RX ORDER — HYDROMORPHONE HYDROCHLORIDE 1 MG/ML
INJECTION, SOLUTION INTRAMUSCULAR; INTRAVENOUS; SUBCUTANEOUS
Status: DISPENSED
Start: 2021-09-29

## (undated) RX ORDER — FENTANYL CITRATE 50 UG/ML
INJECTION, SOLUTION INTRAMUSCULAR; INTRAVENOUS
Status: DISPENSED
Start: 2021-09-29

## (undated) RX ORDER — ACETAMINOPHEN 325 MG/1
TABLET ORAL
Status: DISPENSED
Start: 2021-09-29

## (undated) RX ORDER — TRANEXAMIC ACID 650 MG/1
TABLET ORAL
Status: DISPENSED
Start: 2021-09-29

## (undated) RX ORDER — ONDANSETRON 2 MG/ML
INJECTION INTRAMUSCULAR; INTRAVENOUS
Status: DISPENSED
Start: 2021-09-29